# Patient Record
Sex: MALE | Race: BLACK OR AFRICAN AMERICAN | NOT HISPANIC OR LATINO
[De-identification: names, ages, dates, MRNs, and addresses within clinical notes are randomized per-mention and may not be internally consistent; named-entity substitution may affect disease eponyms.]

---

## 2023-10-08 ENCOUNTER — TRANSCRIPTION ENCOUNTER (OUTPATIENT)
Age: 78
End: 2023-10-08

## 2023-10-08 ENCOUNTER — INPATIENT (INPATIENT)
Facility: HOSPITAL | Age: 78
LOS: 3 days | Discharge: NURSING, REHAB OR LONG-TERM CARE | End: 2023-10-12
Attending: INTERNAL MEDICINE | Admitting: INTERNAL MEDICINE
Payer: MEDICARE

## 2023-10-08 VITALS
TEMPERATURE: 98 F | WEIGHT: 175.05 LBS | OXYGEN SATURATION: 98 % | RESPIRATION RATE: 18 BRPM | SYSTOLIC BLOOD PRESSURE: 149 MMHG | HEIGHT: 71 IN | HEART RATE: 66 BPM | DIASTOLIC BLOOD PRESSURE: 95 MMHG

## 2023-10-08 LAB
ALBUMIN SERPL ELPH-MCNC: 3.2 G/DL — LOW (ref 3.3–5)
ALBUMIN SERPL ELPH-MCNC: 3.3 G/DL — SIGNIFICANT CHANGE UP (ref 3.3–5)
ALP SERPL-CCNC: 89 U/L — SIGNIFICANT CHANGE UP (ref 40–120)
ALP SERPL-CCNC: 97 U/L — SIGNIFICANT CHANGE UP (ref 40–120)
ALT FLD-CCNC: 29 U/L — SIGNIFICANT CHANGE UP (ref 12–78)
ALT FLD-CCNC: 29 U/L — SIGNIFICANT CHANGE UP (ref 12–78)
ANION GAP SERPL CALC-SCNC: 10 MMOL/L — SIGNIFICANT CHANGE UP (ref 5–17)
ANION GAP SERPL CALC-SCNC: 8 MMOL/L — SIGNIFICANT CHANGE UP (ref 5–17)
ANION GAP SERPL CALC-SCNC: 9 MMOL/L — SIGNIFICANT CHANGE UP (ref 5–17)
APTT BLD: 29 SEC — SIGNIFICANT CHANGE UP (ref 24.5–35.6)
AST SERPL-CCNC: 29 U/L — SIGNIFICANT CHANGE UP (ref 15–37)
AST SERPL-CCNC: 35 U/L — SIGNIFICANT CHANGE UP (ref 15–37)
BASOPHILS # BLD AUTO: 0.03 K/UL — SIGNIFICANT CHANGE UP (ref 0–0.2)
BASOPHILS NFR BLD AUTO: 0.3 % — SIGNIFICANT CHANGE UP (ref 0–2)
BILIRUB DIRECT SERPL-MCNC: 0.3 MG/DL — SIGNIFICANT CHANGE UP (ref 0–0.3)
BILIRUB DIRECT SERPL-MCNC: 0.6 MG/DL — HIGH (ref 0–0.3)
BILIRUB INDIRECT FLD-MCNC: 1 MG/DL — SIGNIFICANT CHANGE UP (ref 0.2–1)
BILIRUB INDIRECT FLD-MCNC: 1.9 MG/DL — HIGH (ref 0.2–1)
BILIRUB SERPL-MCNC: 1.3 MG/DL — HIGH (ref 0.2–1.2)
BILIRUB SERPL-MCNC: 2.5 MG/DL — HIGH (ref 0.2–1.2)
BLD GP AB SCN SERPL QL: SIGNIFICANT CHANGE UP
BUN SERPL-MCNC: 10 MG/DL — SIGNIFICANT CHANGE UP (ref 7–23)
BUN SERPL-MCNC: 12 MG/DL — SIGNIFICANT CHANGE UP (ref 7–23)
BUN SERPL-MCNC: 12 MG/DL — SIGNIFICANT CHANGE UP (ref 7–23)
CALCIUM SERPL-MCNC: 9.1 MG/DL — SIGNIFICANT CHANGE UP (ref 8.5–10.1)
CALCIUM SERPL-MCNC: 9.3 MG/DL — SIGNIFICANT CHANGE UP (ref 8.5–10.1)
CALCIUM SERPL-MCNC: 9.3 MG/DL — SIGNIFICANT CHANGE UP (ref 8.5–10.1)
CHLORIDE SERPL-SCNC: 106 MMOL/L — SIGNIFICANT CHANGE UP (ref 96–108)
CHLORIDE SERPL-SCNC: 106 MMOL/L — SIGNIFICANT CHANGE UP (ref 96–108)
CHLORIDE SERPL-SCNC: 109 MMOL/L — HIGH (ref 96–108)
CO2 SERPL-SCNC: 24 MMOL/L — SIGNIFICANT CHANGE UP (ref 22–31)
CO2 SERPL-SCNC: 25 MMOL/L — SIGNIFICANT CHANGE UP (ref 22–31)
CO2 SERPL-SCNC: 25 MMOL/L — SIGNIFICANT CHANGE UP (ref 22–31)
CREAT SERPL-MCNC: 0.89 MG/DL — SIGNIFICANT CHANGE UP (ref 0.5–1.3)
CREAT SERPL-MCNC: 0.95 MG/DL — SIGNIFICANT CHANGE UP (ref 0.5–1.3)
CREAT SERPL-MCNC: 0.96 MG/DL — SIGNIFICANT CHANGE UP (ref 0.5–1.3)
EGFR: 81 ML/MIN/1.73M2 — SIGNIFICANT CHANGE UP
EGFR: 82 ML/MIN/1.73M2 — SIGNIFICANT CHANGE UP
EGFR: 88 ML/MIN/1.73M2 — SIGNIFICANT CHANGE UP
EOSINOPHIL # BLD AUTO: 0.02 K/UL — SIGNIFICANT CHANGE UP (ref 0–0.5)
EOSINOPHIL NFR BLD AUTO: 0.2 % — SIGNIFICANT CHANGE UP (ref 0–6)
ETHANOL SERPL-MCNC: 77 MG/DL — HIGH (ref 0–10)
GLUCOSE SERPL-MCNC: 102 MG/DL — HIGH (ref 70–99)
GLUCOSE SERPL-MCNC: 87 MG/DL — SIGNIFICANT CHANGE UP (ref 70–99)
GLUCOSE SERPL-MCNC: 98 MG/DL — SIGNIFICANT CHANGE UP (ref 70–99)
HCT VFR BLD CALC: 50.2 % — HIGH (ref 39–50)
HGB BLD-MCNC: 16.6 G/DL — SIGNIFICANT CHANGE UP (ref 13–17)
IMM GRANULOCYTES NFR BLD AUTO: 0.3 % — SIGNIFICANT CHANGE UP (ref 0–0.9)
INR BLD: 0.9 RATIO — SIGNIFICANT CHANGE UP (ref 0.85–1.18)
INR BLD: 0.92 RATIO — SIGNIFICANT CHANGE UP (ref 0.85–1.18)
LYMPHOCYTES # BLD AUTO: 0.89 K/UL — LOW (ref 1–3.3)
LYMPHOCYTES # BLD AUTO: 7.7 % — LOW (ref 13–44)
MAGNESIUM SERPL-MCNC: 1.9 MG/DL — SIGNIFICANT CHANGE UP (ref 1.6–2.6)
MAGNESIUM SERPL-MCNC: 2 MG/DL — SIGNIFICANT CHANGE UP (ref 1.6–2.6)
MCHC RBC-ENTMCNC: 33.1 G/DL — SIGNIFICANT CHANGE UP (ref 32–36)
MCHC RBC-ENTMCNC: 33.1 PG — SIGNIFICANT CHANGE UP (ref 27–34)
MCV RBC AUTO: 100 FL — SIGNIFICANT CHANGE UP (ref 80–100)
MONOCYTES # BLD AUTO: 0.59 K/UL — SIGNIFICANT CHANGE UP (ref 0–0.9)
MONOCYTES NFR BLD AUTO: 5.1 % — SIGNIFICANT CHANGE UP (ref 2–14)
NEUTROPHILS # BLD AUTO: 10 K/UL — HIGH (ref 1.8–7.4)
NEUTROPHILS NFR BLD AUTO: 86.4 % — HIGH (ref 43–77)
NRBC # BLD: 0 /100 WBCS — SIGNIFICANT CHANGE UP (ref 0–0)
PHOSPHATE SERPL-MCNC: 3 MG/DL — SIGNIFICANT CHANGE UP (ref 2.5–4.5)
PHOSPHATE SERPL-MCNC: 3.1 MG/DL — SIGNIFICANT CHANGE UP (ref 2.5–4.5)
PLATELET # BLD AUTO: 198 K/UL — SIGNIFICANT CHANGE UP (ref 150–400)
POTASSIUM SERPL-MCNC: 4 MMOL/L — SIGNIFICANT CHANGE UP (ref 3.5–5.3)
POTASSIUM SERPL-MCNC: 4.4 MMOL/L — SIGNIFICANT CHANGE UP (ref 3.5–5.3)
POTASSIUM SERPL-MCNC: 4.8 MMOL/L — SIGNIFICANT CHANGE UP (ref 3.5–5.3)
POTASSIUM SERPL-SCNC: 4 MMOL/L — SIGNIFICANT CHANGE UP (ref 3.5–5.3)
POTASSIUM SERPL-SCNC: 4.4 MMOL/L — SIGNIFICANT CHANGE UP (ref 3.5–5.3)
POTASSIUM SERPL-SCNC: 4.8 MMOL/L — SIGNIFICANT CHANGE UP (ref 3.5–5.3)
PROT SERPL-MCNC: 7.3 GM/DL — SIGNIFICANT CHANGE UP (ref 6–8.3)
PROT SERPL-MCNC: 7.4 GM/DL — SIGNIFICANT CHANGE UP (ref 6–8.3)
PROTHROM AB SERPL-ACNC: 10.7 SEC — SIGNIFICANT CHANGE UP (ref 9.5–13)
PROTHROM AB SERPL-ACNC: 11 SEC — SIGNIFICANT CHANGE UP (ref 9.5–13)
RBC # BLD: 5.02 M/UL — SIGNIFICANT CHANGE UP (ref 4.2–5.8)
RBC # FLD: 13.4 % — SIGNIFICANT CHANGE UP (ref 10.3–14.5)
SODIUM SERPL-SCNC: 140 MMOL/L — SIGNIFICANT CHANGE UP (ref 135–145)
SODIUM SERPL-SCNC: 141 MMOL/L — SIGNIFICANT CHANGE UP (ref 135–145)
SODIUM SERPL-SCNC: 141 MMOL/L — SIGNIFICANT CHANGE UP (ref 135–145)
T3 SERPL-MCNC: 102 NG/DL — SIGNIFICANT CHANGE UP (ref 80–200)
T4 AB SER-ACNC: 5.5 UG/DL — SIGNIFICANT CHANGE UP (ref 4.6–12)
WBC # BLD: 11.57 K/UL — HIGH (ref 3.8–10.5)
WBC # FLD AUTO: 11.57 K/UL — HIGH (ref 3.8–10.5)

## 2023-10-08 PROCEDURE — 73700 CT LOWER EXTREMITY W/O DYE: CPT | Mod: 26,RT,MB

## 2023-10-08 PROCEDURE — 99222 1ST HOSP IP/OBS MODERATE 55: CPT | Mod: 25,57

## 2023-10-08 PROCEDURE — 73590 X-RAY EXAM OF LOWER LEG: CPT | Mod: 26,RT

## 2023-10-08 PROCEDURE — 73610 X-RAY EXAM OF ANKLE: CPT | Mod: 26,RT

## 2023-10-08 PROCEDURE — 70450 CT HEAD/BRAIN W/O DYE: CPT | Mod: 26,MG

## 2023-10-08 PROCEDURE — 70486 CT MAXILLOFACIAL W/O DYE: CPT | Mod: 26,MG

## 2023-10-08 PROCEDURE — 99285 EMERGENCY DEPT VISIT HI MDM: CPT

## 2023-10-08 PROCEDURE — 93010 ELECTROCARDIOGRAM REPORT: CPT | Mod: 76

## 2023-10-08 PROCEDURE — 72125 CT NECK SPINE W/O DYE: CPT | Mod: 26,MG

## 2023-10-08 PROCEDURE — 71045 X-RAY EXAM CHEST 1 VIEW: CPT | Mod: 26

## 2023-10-08 PROCEDURE — G1004: CPT

## 2023-10-08 RX ORDER — THIAMINE MONONITRATE (VIT B1) 100 MG
100 TABLET ORAL DAILY
Refills: 0 | Status: DISCONTINUED | OUTPATIENT
Start: 2023-10-09 | End: 2023-10-09

## 2023-10-08 RX ORDER — SODIUM CHLORIDE 9 MG/ML
1000 INJECTION, SOLUTION INTRAVENOUS
Refills: 0 | Status: DISCONTINUED | OUTPATIENT
Start: 2023-10-08 | End: 2023-10-09

## 2023-10-08 RX ORDER — ENOXAPARIN SODIUM 100 MG/ML
40 INJECTION SUBCUTANEOUS ONCE
Refills: 0 | Status: COMPLETED | OUTPATIENT
Start: 2023-10-08 | End: 2023-10-08

## 2023-10-08 RX ORDER — PANTOPRAZOLE SODIUM 20 MG/1
40 TABLET, DELAYED RELEASE ORAL DAILY
Refills: 0 | Status: DISCONTINUED | OUTPATIENT
Start: 2023-10-08 | End: 2023-10-09

## 2023-10-08 RX ORDER — THIAMINE MONONITRATE (VIT B1) 100 MG
100 TABLET ORAL ONCE
Refills: 0 | Status: COMPLETED | OUTPATIENT
Start: 2023-10-08 | End: 2023-10-08

## 2023-10-08 RX ORDER — THIAMINE MONONITRATE (VIT B1) 100 MG
100 TABLET ORAL ONCE
Refills: 0 | Status: DISCONTINUED | OUTPATIENT
Start: 2023-10-08 | End: 2023-10-09

## 2023-10-08 RX ORDER — HYDRALAZINE HCL 50 MG
10 TABLET ORAL EVERY 8 HOURS
Refills: 0 | Status: DISCONTINUED | OUTPATIENT
Start: 2023-10-08 | End: 2023-10-09

## 2023-10-08 RX ORDER — ENOXAPARIN SODIUM 100 MG/ML
40 INJECTION SUBCUTANEOUS EVERY 24 HOURS
Refills: 0 | Status: DISCONTINUED | OUTPATIENT
Start: 2023-10-09 | End: 2023-10-09

## 2023-10-08 RX ORDER — CEFAZOLIN SODIUM 1 G
2000 VIAL (EA) INJECTION ONCE
Refills: 0 | Status: COMPLETED | OUTPATIENT
Start: 2023-10-08 | End: 2023-10-08

## 2023-10-08 RX ORDER — KETOROLAC TROMETHAMINE 30 MG/ML
15 SYRINGE (ML) INJECTION ONCE
Refills: 0 | Status: DISCONTINUED | OUTPATIENT
Start: 2023-10-08 | End: 2023-10-08

## 2023-10-08 RX ORDER — CHLORHEXIDINE GLUCONATE 213 G/1000ML
1 SOLUTION TOPICAL ONCE
Refills: 0 | Status: COMPLETED | OUTPATIENT
Start: 2023-10-08 | End: 2023-10-08

## 2023-10-08 RX ORDER — KETOROLAC TROMETHAMINE 30 MG/ML
15 SYRINGE (ML) INJECTION EVERY 8 HOURS
Refills: 0 | Status: DISCONTINUED | OUTPATIENT
Start: 2023-10-08 | End: 2023-10-09

## 2023-10-08 RX ADMIN — Medication 100 MILLIGRAM(S): at 09:05

## 2023-10-08 RX ADMIN — Medication 1 PATCH: at 11:55

## 2023-10-08 RX ADMIN — Medication 15 MILLIGRAM(S): at 16:50

## 2023-10-08 RX ADMIN — Medication 1 PATCH: at 18:43

## 2023-10-08 RX ADMIN — Medication 2 MILLIGRAM(S): at 07:32

## 2023-10-08 RX ADMIN — Medication 2000 MILLIGRAM(S): at 09:30

## 2023-10-08 RX ADMIN — Medication 15 MILLIGRAM(S): at 17:15

## 2023-10-08 RX ADMIN — ENOXAPARIN SODIUM 40 MILLIGRAM(S): 100 INJECTION SUBCUTANEOUS at 10:09

## 2023-10-08 RX ADMIN — Medication 25 MILLIGRAM(S): at 21:41

## 2023-10-08 RX ADMIN — Medication 100 MILLIGRAM(S): at 11:58

## 2023-10-08 RX ADMIN — CHLORHEXIDINE GLUCONATE 1 APPLICATION(S): 213 SOLUTION TOPICAL at 17:56

## 2023-10-08 NOTE — H&P ADULT - ASSESSMENT
IMPROVE VTE Individual Risk Assessment    RISK                                                                Points    [  ] Previous VTE                                                  3    [  ] Thrombophilia                                               2    [  ] Lower limb paralysis                                      2        (unable to hold up >15 seconds)      [  ] Current Cancer                                              2         (within 6 months)    [x  ] Immobilization > 24 hrs                                1    [  ] ICU/CCU stay > 24 hours                              1    [ x ] Age > 60                                                      1    IMPROVE VTE Score ______2___    IMPROVE Score 0-1: Low Risk, No VTE prophylaxis required for most patients, encourage ambulation.   IMPROVE Score 2-3: At risk, pharmacologic VTE prophylaxis is indicated for most patients (in the absence of a contraindication)  IMPROVE Score > or = 4: High Risk, pharmacologic VTE prophylaxis is indicated for most patients (in the absence of a contraindication)    A/P  79 yo  w  male  with  hx  htn unsure  of  medications  daily  etoh consumtion of 2/3 hard  drinks  /day  admitted  with rt  trimalleolar fx  unclear  if  syncopal  episode or  etoh  intoxication    # trimalleolar  fx    dvt prophylaxis  pain management  further Rx  as per ortho    # HTN  will  treat  with  clonidine  patch  hydralazine prn  for  now    will restart  home  meds  post  surgery  and  once   clarified    # etoh  use /abuse  will monitor with  symptom driven CIWA protocol  #possible  syncope  check EKG   monitor  on  telemetry further w/u  and  Rx  as per  clinical course

## 2023-10-08 NOTE — ED ADULT NURSE NOTE - SIGNIFICANT NEGATIVE FINDINGS
Denies fall, trauma, blood thinner use, LOC, head injury, vision changes, n/v, numbness, tingling, or other pain

## 2023-10-08 NOTE — H&P ADULT - NSHPPHYSICALEXAM_GEN_ALL_CORE
PHYSICAL EXAM:    GENERAL: NAD, well-groomed, well-developed  HEAD:    dry  excoriations  rt  face  Normocephalic  EYES: EOMI, PERRLA, conjunctiva and sclera clear  ENMT: No tonsillar erythema, exudates, or enlargement; Moist mucous membranes, , No lesions  NECK: Supple, No JVD, Normal thyroid  NERVOUS SYSTEM:  Alert & Oriented X4, ; Motor Strength 5/5 B/L upper and lower extremities; DTRs 2+ intact and symmetric  CHEST/LUNG: Clear  bilaterally; No rales, rhonchi, wheezing, or rubs  HEART: Regular rate and rhythm; No murmurs, rubs, or gallops  ABDOMEN: Soft, Nontender, Nondistended; no  masses Bowel sounds present  EXTREMITIES:  + Peripheral Pulses, No clubbing, cyanosis, or edema  LYMPH: No lymphadenopathy noted   RECTAL: deferred    SKIN: No rashes or lesions PHYSICAL EXAM:    GENERAL: NAD, well-groomed, well-developed  HEAD:    dry  excoriations  rt  face  Normocephalic  EYES: L eye  blindness, conjunctiva and sclera clear  ENMT: No tonsillar erythema, exudates, or enlargement; Moist mucous membranes, , No lesions  NECK: Supple, No JVD, Normal thyroid  NERVOUS SYSTEM:  Alert & Oriented X4, ; Motor Strength 5/5 B/L upper and lower extremities; DTRs 2+ intact and symmetric  CHEST/LUNG: Clear  bilaterally; No rales, rhonchi, wheezing, or rubs  HEART: Regular rate and rhythm; No murmurs, rubs, or gallops  ABDOMEN: Soft, Nontender, Nondistended; no  masses Bowel sounds present  EXTREMITIES:  + Peripheral Pulses, No clubbing, cyanosis, or edema  LYMPH: No lymphadenopathy noted   RECTAL: deferred    SKIN: No rashes or lesions

## 2023-10-08 NOTE — ED ADULT NURSE NOTE - OBJECTIVE STATEMENT
Pt A&Ox4 presents to ED c/o R ankle pain. As per pt he went to get out of bed tonight and noticed that he could not bear weight on the R ankle. Denies fall, trauma, blood thinner use, LOC, head injury, vision changes, n/v, numbness, tingling, or other pain. Pt states he does not know how he injured his ankle. Of note, pt has 1 inch lac to the bridge of the nose. NKA. Of note, pt is a daily drinker and admits to having 2 drinks last night. Pt A&Ox4 presents to ED c/o R ankle pain. As per pt he went to get out of bed tonight and noticed that he could not bear weight on the R ankle. Denies fall, trauma, blood thinner use, LOC, head injury, vision changes, n/v, numbness, tingling, or other pain. Pt states he does not know how he injured his ankle. Of note, pt has abrasion to the bridge of the nose. NKA. Of note, pt is a daily drinker and admits to having 2 drinks last night. ROM in R ankle limited. Xenograft Text: The defect edges were debeveled with a #15 scalpel blade.  Given the location of the defect, shape of the defect and the proximity to free margins a xenograft was deemed most appropriate.  The graft was then trimmed to fit the size of the defect.  The graft was then placed in the primary defect and oriented appropriately.

## 2023-10-08 NOTE — H&P ADULT - NSHPLABSRESULTS_GEN_ALL_CORE
LABS:                        16.6   11.57 )-----------( 198      ( 08 Oct 2023 06:40 )             50.2     10-08    140  |  106  |  12  ----------------------------<  102<H>  4.4   |  25  |  0.95    Ca    9.1      08 Oct 2023 06:40      PT/INR - ( 08 Oct 2023 06:40 )   PT: 10.7 sec;   INR: 0.90 ratio         PTT - ( 08 Oct 2023 06:40 )  PTT:29.0 sec  Urinalysis Basic - ( 08 Oct 2023 06:40 )    Color: x / Appearance: x / SG: x / pH: x  Gluc: 102 mg/dL / Ketone: x  / Bili: x / Urobili: x   Blood: x / Protein: x / Nitrite: x   Leuk Esterase: x / RBC: x / WBC x   Sq Epi: x / Non Sq Epi: x / Bacteria: x

## 2023-10-08 NOTE — ED ADULT NURSE NOTE - CHIEF COMPLAINT QUOTE
bibems from home for right ankle pain.  Pt arrived with splint in place by EMS, per EMS, pt woke up and was unable to bear weight on right ankle.  EMS did notice bruising on the anterior aspect of ankle.   pt denies any fall or trauma, blood thinner use, LOC, head injury, other pain.  Pt able to move toes, cap refills <3, pulses present +2, no loss of sensation.    hx of HTN, nkda.

## 2023-10-08 NOTE — CONSULT NOTE ADULT - SUBJECTIVE AND OBJECTIVE BOX
78y Male community ambulator without assistive device presents to ED after mechanical fall with severe right ankle pain. Has multiple drinks of scotch and vodka every evening for the last 30 years. Patient does not recall any details about the incident; just that he woke up and felt immediate pain and inability to ambulate on the affected leg. Subsequently came to the ED for further evaluation and management. In the ED, the patient notes pain over the affected ankle and pain with range of motion. Patient denies any numbness, tingling, weakness, or any other orthopaedic complaint.     Physical Exam  Vital Signs Last 24 Hrs  T(C): 36.4 (10-08-23 @ 04:54), Max: 36.4 (10-08-23 @ 04:54)  T(F): 97.6 (10-08-23 @ 04:54), Max: 97.6 (10-08-23 @ 04:54)  HR: 66 (10-08-23 @ 04:54) (66 - 66)  BP: 149/95 (10-08-23 @ 04:54) (149/95 - 149/95)  BP(mean): --  RR: 18 (10-08-23 @ 04:54) (18 - 18)  SpO2: 98% (10-08-23 @ 04:54) (98% - 98%)    Gen: Resting in bed, NAD  R LE:   Skin intact. Notable edema and deformity over the ankle. Healing eschar over the medial aspect of the distal leg   TTP over deformity, decreased and painful ROM of the ankle; otherwise, NTTP throughout the rest of the extremity.  SILT L2-S1.    Q/H/EHL/FHL intact. TA/GSC unable to assess 2/2 pain.    DP pulse dopplerable.   No calf tenderness bilaterally.   Compartments soft and compressible.     Secondary Assessment:  NC/AT, NTTP of clavicles, NTTP of C-spine,T-spine, or L-spine in the midline and paraspinal areas; NTTP of pelvis  LUE: NTTP of Shoulder, Elbow, Wrist, Hand; NT with AROM/PROM of Shoulder, Elbow, Wrist, Hand; AIN/PIN/Med/Uln/Msc/Rad/Ax intact  RUE: NTTP of Shoulder, Elbow, Wrist, Hand; NT with AROM/PROM of Shoulder, Elbow, Wrist, Hand; AIN/PIN/Med/Uln/Msc/Rad/Ax intact   LLE: Able to SLR, NT with Log Roll, NT with Heel Strike, NTTP of Hip, Knee, Ankle, Foot; NT with AROM/PROM of Hip, Knee, Ankle, Foot; Q/H/GSC/TA/EHL/FHL intact      Imaging: XR imaging of the right ankle reviewed demonstrating trimalleolar ankle fracture dislocation.    Procedure Note  Risks and benefits for the procedure were explained to the patient. An injection was offered to the patient for analgesia prior to procedure. The patient expressed understanding and agreed with the plan. The patient gave verbal consent for the injection and procedure. The skin was prepped in sterile fashion with alcohol scrub. The fracture site was then injected with 10mL 1% lidocaine without epinephrine. Time was allowed for anesthetic effect to occur. Once the patient was comfortable, the extremity was generally cleaned. The fracture was then manipulated/closed reduced. The extremity was wrapped with Webril, with care to pad the bony prominences over the heel and malleoli. A plaster splint was applied; wrapped with Webril and an ace wrap; and molded until hardened. Care was taken to avoid sharp edges and to protect the skin. The extremity was elevated on pillows and ice was applied. Neurovascular exam was unchanged after the procedure - SILT digits 1-5 and able to wiggle toes. Intact EHL/FHL. Post reduction films were ordered and demonstrated adequate reduction of the fracture.    Assessment and Plan  78y Male with right ankle fracture    Imaging findings reviewed and discussed with the patient. Need for operative intervention discussed.   Fracture reduced and splinted per above procedure note.   NWB R LE in trilam splint, crutches/walker for ambulation as needed  Ice/elevation  Pain control PRN  Prophylactic abx with ancef 2g q8h  Dispo: pending CT evaluation of the right ankle, possible OR. NPO at this time.   Will discuss with  *** and advise of any changes to the plan.     78y Male community ambulator without assistive device presents to ED after mechanical fall with severe right ankle pain. Has multiple drinks of scotch and vodka every evening for the last 30 years. Patient does not recall any details about the incident; just that he woke up and felt immediate pain and inability to ambulate on the affected leg. Subsequently came to the ED for further evaluation and management. In the ED, the patient notes pain over the affected ankle and pain with range of motion. Patient denies any numbness, tingling, weakness, or any other orthopaedic complaint.     Physical Exam  Vital Signs Last 24 Hrs  T(C): 36.4 (10-08-23 @ 04:54), Max: 36.4 (10-08-23 @ 04:54)  T(F): 97.6 (10-08-23 @ 04:54), Max: 97.6 (10-08-23 @ 04:54)  HR: 66 (10-08-23 @ 04:54) (66 - 66)  BP: 149/95 (10-08-23 @ 04:54) (149/95 - 149/95)  BP(mean): --  RR: 18 (10-08-23 @ 04:54) (18 - 18)  SpO2: 98% (10-08-23 @ 04:54) (98% - 98%)    Gen: Resting in bed, NAD  R LE:   Skin intact. Notable edema and deformity over the ankle. Healing eschar over the medial aspect of the distal leg   TTP over deformity, decreased and painful ROM of the ankle; otherwise, NTTP throughout the rest of the extremity.  SILT L2-S1.    Q/H/EHL/FHL intact. TA/GSC unable to assess 2/2 pain.    DP pulse dopplerable.   No calf tenderness bilaterally.   Compartments soft and compressible.     Secondary Assessment:  NC/AT, NTTP of clavicles, NTTP of C-spine,T-spine, or L-spine in the midline and paraspinal areas; NTTP of pelvis  LUE: NTTP of Shoulder, Elbow, Wrist, Hand; NT with AROM/PROM of Shoulder, Elbow, Wrist, Hand; AIN/PIN/Med/Uln/Msc/Rad/Ax intact  RUE: NTTP of Shoulder, Elbow, Wrist, Hand; NT with AROM/PROM of Shoulder, Elbow, Wrist, Hand; AIN/PIN/Med/Uln/Msc/Rad/Ax intact   LLE: Able to SLR, NT with Log Roll, NT with Heel Strike, NTTP of Hip, Knee, Ankle, Foot; NT with AROM/PROM of Hip, Knee, Ankle, Foot; Q/H/GSC/TA/EHL/FHL intact      Imaging: XR imaging of the right ankle reviewed demonstrating trimalleolar ankle fracture dislocation.    Procedure Note  Risks and benefits for the procedure were explained to the patient. An injection was offered to the patient for analgesia prior to procedure. The patient expressed understanding and agreed with the plan. The patient gave verbal consent for the injection and procedure. The skin was prepped in sterile fashion with alcohol scrub. The fracture site was then injected with 10mL 1% lidocaine without epinephrine. Time was allowed for anesthetic effect to occur. Once the patient was comfortable, the extremity was generally cleaned. The fracture was then manipulated/closed reduced. The extremity was wrapped with Webril, with care to pad the bony prominences over the heel and malleoli. A plaster splint was applied; wrapped with Webril and an ace wrap; and molded until hardened. Care was taken to avoid sharp edges and to protect the skin. The extremity was elevated on pillows and ice was applied. Neurovascular exam was unchanged after the procedure - SILT digits 1-5 and able to wiggle toes. Intact EHL/FHL. Post reduction films were ordered and demonstrated adequate reduction of the fracture.    Assessment and Plan  78y Male with right ankle fracture    Imaging findings reviewed and discussed with the patient. Need for operative intervention discussed.   Fracture reduced and splinted per above procedure note.   NWB R SELENA in trilam splint, crutches/walker for ambulation as needed  Ice/elevation  Pain control PRN  Dispo: recommend medicine admission given significant drinking history and need for CIWA, can plan for surgery ideally 10/9   Will discuss with Dr. Puri and advise of any changes to the plan.     78y Male community ambulator without assistive device presents to ED after mechanical fall with severe right ankle pain. Has multiple drinks of scotch and vodka every evening for the last 30 years. Patient does not recall any details about the incident; just that he woke up and felt immediate pain and inability to ambulate on the affected leg. Subsequently came to the ED for further evaluation and management. In the ED, the patient notes pain over the affected ankle and pain with range of motion. Patient denies any numbness, tingling, weakness, or any other orthopaedic complaint.     Physical Exam  Vital Signs Last 24 Hrs  T(C): 36.4 (10-08-23 @ 04:54), Max: 36.4 (10-08-23 @ 04:54)  T(F): 97.6 (10-08-23 @ 04:54), Max: 97.6 (10-08-23 @ 04:54)  HR: 66 (10-08-23 @ 04:54) (66 - 66)  BP: 149/95 (10-08-23 @ 04:54) (149/95 - 149/95)  BP(mean): --  RR: 18 (10-08-23 @ 04:54) (18 - 18)  SpO2: 98% (10-08-23 @ 04:54) (98% - 98%)    Gen: Resting in bed, NAD  Pt is AAOx3    R LE:   Skin intact. Notable edema and deformity over the ankle. Healing eschar over the medial aspect of the distal leg   TTP over deformity, decreased and painful ROM of the ankle; otherwise, NTTP throughout the rest of the extremity.  SILT L2-S1.    Q/H/EHL/FHL intact. TA/GSC unable to assess 2/2 pain.    DP pulse dopplerable.   No calf tenderness bilaterally.   Compartments soft and compressible.     Secondary Assessment:  NC/AT, NTTP of clavicles, NTTP of C-spine,T-spine, or L-spine in the midline and paraspinal areas; NTTP of pelvis  LUE: NTTP of Shoulder, Elbow, Wrist, Hand; NT with AROM/PROM of Shoulder, Elbow, Wrist, Hand; AIN/PIN/Med/Uln/Msc/Rad/Ax intact  RUE: NTTP of Shoulder, Elbow, Wrist, Hand; NT with AROM/PROM of Shoulder, Elbow, Wrist, Hand; AIN/PIN/Med/Uln/Msc/Rad/Ax intact   LLE: Able to SLR, NT with Log Roll, NT with Heel Strike, NTTP of Hip, Knee, Ankle, Foot; NT with AROM/PROM of Hip, Knee, Ankle, Foot; Q/H/GSC/TA/EHL/FHL intact      Imaging: XR imaging of the right ankle reviewed demonstrating trimalleolar ankle fracture dislocation.    Procedure Note  Risks and benefits for the procedure were explained to the patient. An injection was offered to the patient for analgesia prior to procedure. The patient expressed understanding and agreed with the plan. The patient gave verbal consent for the injection and procedure. The skin was prepped in sterile fashion with alcohol scrub. The fracture site was then injected with 10mL 1% lidocaine without epinephrine. Time was allowed for anesthetic effect to occur. Once the patient was comfortable, the extremity was generally cleaned. The fracture was then manipulated/closed reduced. The extremity was wrapped with Webril, with care to pad the bony prominences over the heel and malleoli. A plaster splint was applied; wrapped with Webril and an ace wrap; and molded until hardened. Care was taken to avoid sharp edges and to protect the skin. The extremity was elevated on pillows and ice was applied. Neurovascular exam was unchanged after the procedure - SILT digits 1-5 and able to wiggle toes. Intact EHL/FHL. Post reduction films were ordered and demonstrated adequate reduction of the fracture.    Assessment and Plan  78y Male with right ankle fracture    Imaging findings reviewed and discussed with the patient. Need for operative intervention discussed.   Fracture reduced and splinted per above procedure note.   NWB R LE in trilam splint, crutches/walker for ambulation as needed  Ice/elevation  Pain control PRN  Dispo: recommend medicine admission given significant drinking history and need for CIWA, can plan for surgery ideally 10/9   Will discuss with Dr. Puri and advise of any changes to the plan.

## 2023-10-08 NOTE — ED PROVIDER NOTE - PROGRESS NOTE DETAILS
ortho consulted , eval pending. MD Marshal: Pt received on sign-out from Dr. Bowling. 78 M w/ R trimal/ ankle reduction & splint placed by orthopedics. orhopedics planning for OR tomorrow. will admit to medicine for monitoring for possible EtOH withdrawal

## 2023-10-08 NOTE — ED ADULT TRIAGE NOTE - CHIEF COMPLAINT QUOTE
bibems from home for right ankle pain.  per EMS, pt woke up and was unable to bear weight on right ankle, pt denies any fall or trauma, LOC, head injury, other pain.  Pt able to move toes, cap refills <3, pulses present +2, no loss of sensation.    hx of HTN, nkda. bibems from home for right ankle pain.  Pt arrived with splint in place by EMS, per EMS, pt woke up and was unable to bear weight on right ankle.  EMS did notice bruising on the anterior aspect of ankle.   pt denies any fall or trauma, blood thinner use, LOC, head injury, other pain.  Pt able to move toes, cap refills <3, pulses present +2, no loss of sensation.    hx of HTN, nkda.

## 2023-10-08 NOTE — H&P ADULT - NSICDXPASTMEDICALHX_GEN_ALL_CORE_FT
PAST MEDICAL HISTORY:  HTN (hypertension)      PAST MEDICAL HISTORY:  Acquired blindness of one eye     HTN (hypertension)

## 2023-10-08 NOTE — ED PROVIDER NOTE - CLINICAL SUMMARY MEDICAL DECISION MAKING FREE TEXT BOX
78-year-old male history of hypertension brought in by EMS for right ankle pain.  States he was drinking alcohol today, unknown quantity.  States he will go home and try to put weight on his ankle was unable to do so.  Patient states he likely fell but does not remember given abrasion over nose and bruising over the medial aspect of the right ankle.  Family member at bedside to provide collateral.  Was brought in from home.  Denies blood thinner use.  Denies head or neck pain currently.  Endorsing pain primarily over the right ankle.  Normally does not use anything to ambulate with.  Patient has DP, posterior tibial pulse as well, checked with Doppler.  Sensation intact.  Tenderness over the medial malleolus.  Likely acute fracture given clinical exam.  Unable to ephraim or invert at the ankle.  Able to flex/extend at the knee with full range of motion.  No pain over the pelvis, pelvis is stable.  Abrasion over nose, no nasal septal hematoma appreciated on my exam.  No other signs of external trauma.  No midline C-spine tenderness.  Stable vitals on arrival.  CT head, CT C-spine, CT maxillofacial.  Rule out fracture, bleed.  X-ray ankle, tib-fib rule out likely fracture, assess for dislocation as well.  Will reassess status post imaging.

## 2023-10-08 NOTE — H&P ADULT - NSHPREVIEWOFSYSTEMS_GEN_ALL_CORE
79 yo  M  with  hx  htn  hx  daily  etoh use  2/3 drink  everyday  for  40  years  evaluated  in  ER  with co rt  ankle  pain inability  to  stand  found  to  have  trimalleolar fx  admitted  for  surgical repair  states   he  must  have  fallen states  is  never  drunk  has  he  takes same  amount of  alcohol  daily for  over 40 years   denies  chest pain  sob  ha  blurry  vision continues  with  some  rt  ankle pain  states  take 2 medications  for HTN Losartan 100 mg/day  and  another  medication  which  cannot  remember  name  10 mg/day  have called  pt's  significant  other  Dahlia  [phone] she does  not  know  medication and  is  not  home to look

## 2023-10-08 NOTE — ED PROVIDER NOTE - OBJECTIVE STATEMENT
78-year-old male history of hypertension brought in by EMS for right ankle pain.  States he was drinking alcohol today, unknown quantity.  States he will go home and try to put weight on his ankle was unable to do so.  Patient states he likely fell but does not remember given abrasion over nose and bruising over the medial aspect of the right ankle.  Family member at bedside to provide collateral.  Was brought in from home.  Denies blood thinner use.  Denies head or neck pain currently.  Endorsing pain primarily over the right ankle.  Normally does not use anything to ambulate with.

## 2023-10-08 NOTE — ED ADULT NURSE REASSESSMENT NOTE - NS ED NURSE REASSESS COMMENT FT1
Assumed care at 0700hrs. Patient Alert and oriented x 4. Orthopedic doctors at bedside, attempting to reduce ankle fracture dislocation. Patient given and tolerated 2 mg Ativan For reduction after ankle block given. Patient admits to drinking "a lot of vodka and scotch every day." Dr. Araya made aware.

## 2023-10-08 NOTE — ED ADULT NURSE NOTE - NSFALLUNIVINTERV_ED_ALL_ED
Bed/Stretcher in lowest position, wheels locked, appropriate side rails in place/Call bell, personal items and telephone in reach/Instruct patient to call for assistance before getting out of bed/chair/stretcher/Non-slip footwear applied when patient is off stretcher/[place] to call system/Physically safe environment - no spills, clutter or unnecessary equipment/Purposeful proactive rounding/Room/bathroom lighting operational, light cord in reach

## 2023-10-08 NOTE — ED ADULT NURSE NOTE - ED STAT RN HANDOFF DETAILS
Report received from RADHA Field. Safety checks completed this shift. Safety rounds completed hourly.  IV sites checked Q2+remains WDL. Medications administered as ordered with no signs/symptoms of adverse reactions. Fall & skin precautions in place. pt pending bed assignment. no acute distress noted. respirations even and unlabored.

## 2023-10-08 NOTE — ED PROVIDER NOTE - PHYSICAL EXAMINATION
General: Well appearing male in no acute distress  HEENT: Normocephalic, atraumatic. Moist mucous membranes. Oropharynx clear. No lymphadenopathy. no nasal septal hematoma.   Eyes: No scleral icterus. EOMI. LINDSEY.  Neck:. Soft and supple. Full ROM without pain. No midline tenderness  Cardiac: Regular rate and regular rhythm. No murmurs, rubs, gallops. Peripheral pulses 2+ and symmetric. No LE edema.  Resp: Lungs CTAB. Speaking in full sentences. No wheezes, rales or rhonchi.  Abd: Soft, non-tender, non-distended. No guarding or rebound. No scars, masses, or lesions.  Back: Spine midline and non-tender. No CVA tenderness.    Skin: +abrasion over nose, ecchymosis over medial aspect of ankle, edema.   Neuro: AO x 3. Moves all extremities symmetrically. Motor strength and sensation grossly intact.  MSK: DP2+, PT 2+, unable to ephraim/invert at the R ankle, full ROM at the knee. sensation intact. no crepitus over ankle, compartment of LLE is soft.

## 2023-10-08 NOTE — ED ADULT NURSE NOTE - ED STAT RN HANDOFF DETAILS 2
Report endorsed to RADHA Nettles. Safety checks completed this shift. Safety rounds completed hourly.  IV sites checked Q2+remains WDL. Medications administered as ordered with no signs/symptoms of adverse reactions. Fall & skin precautions in place. Any issues endorsed to RADHA Nettles for follow up.

## 2023-10-09 ENCOUNTER — TRANSCRIPTION ENCOUNTER (OUTPATIENT)
Age: 78
End: 2023-10-09

## 2023-10-09 LAB
ALBUMIN SERPL ELPH-MCNC: 3.1 G/DL — LOW (ref 3.3–5)
ALP SERPL-CCNC: 96 U/L — SIGNIFICANT CHANGE UP (ref 40–120)
ALT FLD-CCNC: 24 U/L — SIGNIFICANT CHANGE UP (ref 12–78)
ANION GAP SERPL CALC-SCNC: 10 MMOL/L — SIGNIFICANT CHANGE UP (ref 5–17)
ANION GAP SERPL CALC-SCNC: 7 MMOL/L — SIGNIFICANT CHANGE UP (ref 5–17)
APTT BLD: 34.3 SEC — SIGNIFICANT CHANGE UP (ref 24.5–35.6)
AST SERPL-CCNC: 33 U/L — SIGNIFICANT CHANGE UP (ref 15–37)
BILIRUB SERPL-MCNC: 3 MG/DL — HIGH (ref 0.2–1.2)
BUN SERPL-MCNC: 18 MG/DL — SIGNIFICANT CHANGE UP (ref 7–23)
BUN SERPL-MCNC: 21 MG/DL — SIGNIFICANT CHANGE UP (ref 7–23)
CALCIUM SERPL-MCNC: 8.8 MG/DL — SIGNIFICANT CHANGE UP (ref 8.5–10.1)
CALCIUM SERPL-MCNC: 9.4 MG/DL — SIGNIFICANT CHANGE UP (ref 8.5–10.1)
CHLORIDE SERPL-SCNC: 105 MMOL/L — SIGNIFICANT CHANGE UP (ref 96–108)
CHLORIDE SERPL-SCNC: 111 MMOL/L — HIGH (ref 96–108)
CO2 SERPL-SCNC: 22 MMOL/L — SIGNIFICANT CHANGE UP (ref 22–31)
CO2 SERPL-SCNC: 24 MMOL/L — SIGNIFICANT CHANGE UP (ref 22–31)
CREAT SERPL-MCNC: 0.87 MG/DL — SIGNIFICANT CHANGE UP (ref 0.5–1.3)
CREAT SERPL-MCNC: 1.06 MG/DL — SIGNIFICANT CHANGE UP (ref 0.5–1.3)
EGFR: 72 ML/MIN/1.73M2 — SIGNIFICANT CHANGE UP
EGFR: 88 ML/MIN/1.73M2 — SIGNIFICANT CHANGE UP
GLUCOSE SERPL-MCNC: 112 MG/DL — HIGH (ref 70–99)
GLUCOSE SERPL-MCNC: 86 MG/DL — SIGNIFICANT CHANGE UP (ref 70–99)
HCT VFR BLD CALC: 45.1 % — SIGNIFICANT CHANGE UP (ref 39–50)
HCT VFR BLD CALC: 50 % — SIGNIFICANT CHANGE UP (ref 39–50)
HCV AB S/CO SERPL IA: 0.17 S/CO — SIGNIFICANT CHANGE UP (ref 0–0.99)
HCV AB SERPL-IMP: SIGNIFICANT CHANGE UP
HGB BLD-MCNC: 15.5 G/DL — SIGNIFICANT CHANGE UP (ref 13–17)
HGB BLD-MCNC: 17.1 G/DL — HIGH (ref 13–17)
INR BLD: 0.92 RATIO — SIGNIFICANT CHANGE UP (ref 0.85–1.18)
MCHC RBC-ENTMCNC: 33.9 PG — SIGNIFICANT CHANGE UP (ref 27–34)
MCHC RBC-ENTMCNC: 34.2 G/DL — SIGNIFICANT CHANGE UP (ref 32–36)
MCHC RBC-ENTMCNC: 34.4 G/DL — SIGNIFICANT CHANGE UP (ref 32–36)
MCHC RBC-ENTMCNC: 34.4 PG — HIGH (ref 27–34)
MCV RBC AUTO: 100.2 FL — HIGH (ref 80–100)
MCV RBC AUTO: 99 FL — SIGNIFICANT CHANGE UP (ref 80–100)
NRBC # BLD: 0 /100 WBCS — SIGNIFICANT CHANGE UP (ref 0–0)
NRBC # BLD: 0 /100 WBCS — SIGNIFICANT CHANGE UP (ref 0–0)
PLATELET # BLD AUTO: 145 K/UL — LOW (ref 150–400)
PLATELET # BLD AUTO: 180 K/UL — SIGNIFICANT CHANGE UP (ref 150–400)
POTASSIUM SERPL-MCNC: 4.2 MMOL/L — SIGNIFICANT CHANGE UP (ref 3.5–5.3)
POTASSIUM SERPL-MCNC: 4.4 MMOL/L — SIGNIFICANT CHANGE UP (ref 3.5–5.3)
POTASSIUM SERPL-SCNC: 4.2 MMOL/L — SIGNIFICANT CHANGE UP (ref 3.5–5.3)
POTASSIUM SERPL-SCNC: 4.4 MMOL/L — SIGNIFICANT CHANGE UP (ref 3.5–5.3)
PROT SERPL-MCNC: 7.2 GM/DL — SIGNIFICANT CHANGE UP (ref 6–8.3)
PROTHROM AB SERPL-ACNC: 11.1 SEC — SIGNIFICANT CHANGE UP (ref 9.5–13)
RBC # BLD: 4.5 M/UL — SIGNIFICANT CHANGE UP (ref 4.2–5.8)
RBC # BLD: 5.05 M/UL — SIGNIFICANT CHANGE UP (ref 4.2–5.8)
RBC # FLD: 13.3 % — SIGNIFICANT CHANGE UP (ref 10.3–14.5)
RBC # FLD: 13.3 % — SIGNIFICANT CHANGE UP (ref 10.3–14.5)
SODIUM SERPL-SCNC: 139 MMOL/L — SIGNIFICANT CHANGE UP (ref 135–145)
SODIUM SERPL-SCNC: 140 MMOL/L — SIGNIFICANT CHANGE UP (ref 135–145)
WBC # BLD: 11.89 K/UL — HIGH (ref 3.8–10.5)
WBC # BLD: 12.71 K/UL — HIGH (ref 3.8–10.5)
WBC # FLD AUTO: 11.89 K/UL — HIGH (ref 3.8–10.5)
WBC # FLD AUTO: 12.71 K/UL — HIGH (ref 3.8–10.5)

## 2023-10-09 PROCEDURE — 27829 TREAT LOWER LEG JOINT: CPT | Mod: RT

## 2023-10-09 PROCEDURE — 27822 TREATMENT OF ANKLE FRACTURE: CPT | Mod: RT

## 2023-10-09 DEVICE — SCREW LOCKING 3.5X20: Type: IMPLANTABLE DEVICE | Site: RIGHT | Status: FUNCTIONAL

## 2023-10-09 DEVICE — SCREW FT 3.5X12MM: Type: IMPLANTABLE DEVICE | Site: RIGHT | Status: FUNCTIONAL

## 2023-10-09 DEVICE — SCREW 3.5X50MM: Type: IMPLANTABLE DEVICE | Site: RIGHT | Status: FUNCTIONAL

## 2023-10-09 DEVICE — SCREW CANN TI 4 X44MM: Type: IMPLANTABLE DEVICE | Site: RIGHT | Status: FUNCTIONAL

## 2023-10-09 DEVICE — SCREW LOKG 3.5 X16MM: Type: IMPLANTABLE DEVICE | Site: RIGHT | Status: FUNCTIONAL

## 2023-10-09 DEVICE — SCREW LOKG 3.5X14MM: Type: IMPLANTABLE DEVICE | Site: RIGHT | Status: FUNCTIONAL

## 2023-10-09 DEVICE — SCREW 3.5X14MM: Type: IMPLANTABLE DEVICE | Site: RIGHT | Status: FUNCTIONAL

## 2023-10-09 DEVICE — GWIRE 1.4X150MM: Type: IMPLANTABLE DEVICE | Site: RIGHT | Status: FUNCTIONAL

## 2023-10-09 DEVICE — SCREW VARIAX LOCKING 3.5X18MM: Type: IMPLANTABLE DEVICE | Site: RIGHT | Status: FUNCTIONAL

## 2023-10-09 DEVICE — PLATE FIBULA 4 HOLE: Type: IMPLANTABLE DEVICE | Site: RIGHT | Status: FUNCTIONAL

## 2023-10-09 DEVICE — K-WIRE STRYKER WITH 4.5 MM STOP 2MM X 150: Type: IMPLANTABLE DEVICE | Site: RIGHT | Status: FUNCTIONAL

## 2023-10-09 RX ORDER — SODIUM CHLORIDE 9 MG/ML
1000 INJECTION, SOLUTION INTRAVENOUS
Refills: 0 | Status: DISCONTINUED | OUTPATIENT
Start: 2023-10-09 | End: 2023-10-10

## 2023-10-09 RX ORDER — PANTOPRAZOLE SODIUM 20 MG/1
40 TABLET, DELAYED RELEASE ORAL
Refills: 0 | Status: DISCONTINUED | OUTPATIENT
Start: 2023-10-09 | End: 2023-10-12

## 2023-10-09 RX ORDER — THIAMINE MONONITRATE (VIT B1) 100 MG
100 TABLET ORAL DAILY
Refills: 0 | Status: COMPLETED | OUTPATIENT
Start: 2023-10-09 | End: 2023-10-12

## 2023-10-09 RX ORDER — ONDANSETRON 8 MG/1
4 TABLET, FILM COATED ORAL ONCE
Refills: 0 | Status: DISCONTINUED | OUTPATIENT
Start: 2023-10-09 | End: 2023-10-10

## 2023-10-09 RX ORDER — OXYCODONE HYDROCHLORIDE 5 MG/1
2.5 TABLET ORAL EVERY 4 HOURS
Refills: 0 | Status: DISCONTINUED | OUTPATIENT
Start: 2023-10-09 | End: 2023-10-12

## 2023-10-09 RX ORDER — ACETAMINOPHEN 500 MG
650 TABLET ORAL EVERY 6 HOURS
Refills: 0 | Status: DISCONTINUED | OUTPATIENT
Start: 2023-10-09 | End: 2023-10-12

## 2023-10-09 RX ORDER — MAGNESIUM HYDROXIDE 400 MG/1
30 TABLET, CHEWABLE ORAL DAILY
Refills: 0 | Status: DISCONTINUED | OUTPATIENT
Start: 2023-10-09 | End: 2023-10-12

## 2023-10-09 RX ORDER — CEFAZOLIN SODIUM 1 G
2000 VIAL (EA) INJECTION EVERY 8 HOURS
Refills: 0 | Status: DISCONTINUED | OUTPATIENT
Start: 2023-10-09 | End: 2023-10-09

## 2023-10-09 RX ORDER — FOLIC ACID 0.8 MG
1 TABLET ORAL DAILY
Refills: 0 | Status: DISCONTINUED | OUTPATIENT
Start: 2023-10-09 | End: 2023-10-12

## 2023-10-09 RX ORDER — CEFAZOLIN SODIUM 1 G
500 VIAL (EA) INJECTION EVERY 8 HOURS
Refills: 0 | Status: DISCONTINUED | OUTPATIENT
Start: 2023-10-09 | End: 2023-10-09

## 2023-10-09 RX ORDER — CEFAZOLIN SODIUM 1 G
2000 VIAL (EA) INJECTION EVERY 8 HOURS
Refills: 0 | Status: DISCONTINUED | OUTPATIENT
Start: 2023-10-09 | End: 2023-10-12

## 2023-10-09 RX ORDER — SODIUM CHLORIDE 9 MG/ML
500 INJECTION, SOLUTION INTRAVENOUS ONCE
Refills: 0 | Status: COMPLETED | OUTPATIENT
Start: 2023-10-09 | End: 2023-10-09

## 2023-10-09 RX ORDER — ONDANSETRON 8 MG/1
4 TABLET, FILM COATED ORAL EVERY 6 HOURS
Refills: 0 | Status: DISCONTINUED | OUTPATIENT
Start: 2023-10-09 | End: 2023-10-12

## 2023-10-09 RX ORDER — CEFAZOLIN SODIUM 1 G
2000 VIAL (EA) INJECTION ONCE
Refills: 0 | Status: DISCONTINUED | OUTPATIENT
Start: 2023-10-09 | End: 2023-10-09

## 2023-10-09 RX ORDER — FENTANYL CITRATE 50 UG/ML
50 INJECTION INTRAVENOUS
Refills: 0 | Status: DISCONTINUED | OUTPATIENT
Start: 2023-10-09 | End: 2023-10-10

## 2023-10-09 RX ORDER — TRAMADOL HYDROCHLORIDE 50 MG/1
50 TABLET ORAL EVERY 4 HOURS
Refills: 0 | Status: DISCONTINUED | OUTPATIENT
Start: 2023-10-09 | End: 2023-10-12

## 2023-10-09 RX ORDER — CEFAZOLIN SODIUM 1 G
VIAL (EA) INJECTION
Refills: 0 | Status: DISCONTINUED | OUTPATIENT
Start: 2023-10-09 | End: 2023-10-09

## 2023-10-09 RX ORDER — ASPIRIN/CALCIUM CARB/MAGNESIUM 324 MG
325 TABLET ORAL DAILY
Refills: 0 | Status: DISCONTINUED | OUTPATIENT
Start: 2023-10-10 | End: 2023-10-12

## 2023-10-09 RX ORDER — ACETAMINOPHEN 500 MG
1000 TABLET ORAL ONCE
Refills: 0 | Status: DISCONTINUED | OUTPATIENT
Start: 2023-10-09 | End: 2023-10-12

## 2023-10-09 RX ORDER — LOSARTAN POTASSIUM 100 MG/1
100 TABLET, FILM COATED ORAL DAILY
Refills: 0 | Status: DISCONTINUED | OUTPATIENT
Start: 2023-10-09 | End: 2023-10-12

## 2023-10-09 RX ORDER — SENNA PLUS 8.6 MG/1
2 TABLET ORAL AT BEDTIME
Refills: 0 | Status: DISCONTINUED | OUTPATIENT
Start: 2023-10-09 | End: 2023-10-12

## 2023-10-09 RX ORDER — CARVEDILOL PHOSPHATE 80 MG/1
6.25 CAPSULE, EXTENDED RELEASE ORAL EVERY 12 HOURS
Refills: 0 | Status: DISCONTINUED | OUTPATIENT
Start: 2023-10-09 | End: 2023-10-12

## 2023-10-09 RX ORDER — HYDROMORPHONE HYDROCHLORIDE 2 MG/ML
0.5 INJECTION INTRAMUSCULAR; INTRAVENOUS; SUBCUTANEOUS
Refills: 0 | Status: DISCONTINUED | OUTPATIENT
Start: 2023-10-09 | End: 2023-10-10

## 2023-10-09 RX ORDER — POLYETHYLENE GLYCOL 3350 17 G/17G
17 POWDER, FOR SOLUTION ORAL AT BEDTIME
Refills: 0 | Status: DISCONTINUED | OUTPATIENT
Start: 2023-10-09 | End: 2023-10-12

## 2023-10-09 RX ORDER — OXYCODONE HYDROCHLORIDE 5 MG/1
5 TABLET ORAL EVERY 4 HOURS
Refills: 0 | Status: DISCONTINUED | OUTPATIENT
Start: 2023-10-09 | End: 2023-10-12

## 2023-10-09 RX ORDER — SODIUM CHLORIDE 9 MG/ML
500 INJECTION, SOLUTION INTRAVENOUS ONCE
Refills: 0 | Status: COMPLETED | OUTPATIENT
Start: 2023-10-09 | End: 2023-10-10

## 2023-10-09 RX ADMIN — Medication 15 MILLIGRAM(S): at 04:50

## 2023-10-09 RX ADMIN — Medication 100 MILLIGRAM(S): at 22:46

## 2023-10-09 RX ADMIN — SODIUM CHLORIDE 125 MILLILITER(S): 9 INJECTION, SOLUTION INTRAVENOUS at 12:39

## 2023-10-09 RX ADMIN — Medication 1 PATCH: at 18:21

## 2023-10-09 RX ADMIN — SODIUM CHLORIDE 125 MILLILITER(S): 9 INJECTION, SOLUTION INTRAVENOUS at 18:07

## 2023-10-09 RX ADMIN — SODIUM CHLORIDE 75 MILLILITER(S): 9 INJECTION, SOLUTION INTRAVENOUS at 00:34

## 2023-10-09 RX ADMIN — Medication 650 MILLIGRAM(S): at 18:03

## 2023-10-09 RX ADMIN — SODIUM CHLORIDE 500 MILLILITER(S): 9 INJECTION, SOLUTION INTRAVENOUS at 13:49

## 2023-10-09 RX ADMIN — LOSARTAN POTASSIUM 100 MILLIGRAM(S): 100 TABLET, FILM COATED ORAL at 18:40

## 2023-10-09 RX ADMIN — Medication 2 MILLIGRAM(S): at 18:02

## 2023-10-09 RX ADMIN — Medication 1 MILLIGRAM(S): at 18:02

## 2023-10-09 RX ADMIN — Medication 1 TABLET(S): at 18:40

## 2023-10-09 RX ADMIN — SODIUM CHLORIDE 125 MILLILITER(S): 9 INJECTION, SOLUTION INTRAVENOUS at 23:23

## 2023-10-09 RX ADMIN — Medication 650 MILLIGRAM(S): at 18:43

## 2023-10-09 RX ADMIN — PANTOPRAZOLE SODIUM 40 MILLIGRAM(S): 20 TABLET, DELAYED RELEASE ORAL at 18:03

## 2023-10-09 RX ADMIN — CARVEDILOL PHOSPHATE 6.25 MILLIGRAM(S): 80 CAPSULE, EXTENDED RELEASE ORAL at 18:43

## 2023-10-09 RX ADMIN — SODIUM CHLORIDE 500 MILLILITER(S): 9 INJECTION, SOLUTION INTRAVENOUS at 18:12

## 2023-10-09 RX ADMIN — Medication 100 MILLIGRAM(S): at 18:02

## 2023-10-09 NOTE — ED ADULT NURSE REASSESSMENT NOTE - NS ED NURSE REASSESS COMMENT FT1
pt able to move right lower extremity toes. pt denies pain, or discomfort at this time. no acute distress noted. respirations even and unlabored. pt A&ox4. pt able to move right lower extremity toes. pt denies pain, or discomfort at this time. no acute distress noted. respirations even and unlabored. pt A&ox4. +sensations

## 2023-10-10 LAB
ALBUMIN SERPL ELPH-MCNC: 2.6 G/DL — LOW (ref 3.3–5)
ALP SERPL-CCNC: 83 U/L — SIGNIFICANT CHANGE UP (ref 40–120)
ALT FLD-CCNC: 29 U/L — SIGNIFICANT CHANGE UP (ref 12–78)
ANION GAP SERPL CALC-SCNC: 4 MMOL/L — LOW (ref 5–17)
ANION GAP SERPL CALC-SCNC: 5 MMOL/L — SIGNIFICANT CHANGE UP (ref 5–17)
AST SERPL-CCNC: 32 U/L — SIGNIFICANT CHANGE UP (ref 15–37)
BILIRUB SERPL-MCNC: 1.2 MG/DL — SIGNIFICANT CHANGE UP (ref 0.2–1.2)
BUN SERPL-MCNC: 15 MG/DL — SIGNIFICANT CHANGE UP (ref 7–23)
BUN SERPL-MCNC: 16 MG/DL — SIGNIFICANT CHANGE UP (ref 7–23)
CALCIUM SERPL-MCNC: 8.8 MG/DL — SIGNIFICANT CHANGE UP (ref 8.5–10.1)
CALCIUM SERPL-MCNC: 8.8 MG/DL — SIGNIFICANT CHANGE UP (ref 8.5–10.1)
CHLORIDE SERPL-SCNC: 110 MMOL/L — HIGH (ref 96–108)
CHLORIDE SERPL-SCNC: 111 MMOL/L — HIGH (ref 96–108)
CO2 SERPL-SCNC: 26 MMOL/L — SIGNIFICANT CHANGE UP (ref 22–31)
CO2 SERPL-SCNC: 26 MMOL/L — SIGNIFICANT CHANGE UP (ref 22–31)
CREAT SERPL-MCNC: 1.11 MG/DL — SIGNIFICANT CHANGE UP (ref 0.5–1.3)
CREAT SERPL-MCNC: 1.12 MG/DL — SIGNIFICANT CHANGE UP (ref 0.5–1.3)
EGFR: 67 ML/MIN/1.73M2 — SIGNIFICANT CHANGE UP
EGFR: 68 ML/MIN/1.73M2 — SIGNIFICANT CHANGE UP
GLUCOSE SERPL-MCNC: 104 MG/DL — HIGH (ref 70–99)
GLUCOSE SERPL-MCNC: 109 MG/DL — HIGH (ref 70–99)
HCT VFR BLD CALC: 42.2 % — SIGNIFICANT CHANGE UP (ref 39–50)
HGB BLD-MCNC: 14 G/DL — SIGNIFICANT CHANGE UP (ref 13–17)
MCHC RBC-ENTMCNC: 33.2 G/DL — SIGNIFICANT CHANGE UP (ref 32–36)
MCHC RBC-ENTMCNC: 33.2 PG — SIGNIFICANT CHANGE UP (ref 27–34)
MCV RBC AUTO: 100 FL — SIGNIFICANT CHANGE UP (ref 80–100)
NRBC # BLD: 0 /100 WBCS — SIGNIFICANT CHANGE UP (ref 0–0)
PLATELET # BLD AUTO: 181 K/UL — SIGNIFICANT CHANGE UP (ref 150–400)
POTASSIUM SERPL-MCNC: 3.7 MMOL/L — SIGNIFICANT CHANGE UP (ref 3.5–5.3)
POTASSIUM SERPL-MCNC: 3.7 MMOL/L — SIGNIFICANT CHANGE UP (ref 3.5–5.3)
POTASSIUM SERPL-SCNC: 3.7 MMOL/L — SIGNIFICANT CHANGE UP (ref 3.5–5.3)
POTASSIUM SERPL-SCNC: 3.7 MMOL/L — SIGNIFICANT CHANGE UP (ref 3.5–5.3)
PROT SERPL-MCNC: 6.2 GM/DL — SIGNIFICANT CHANGE UP (ref 6–8.3)
RBC # BLD: 4.22 M/UL — SIGNIFICANT CHANGE UP (ref 4.2–5.8)
RBC # FLD: 13.5 % — SIGNIFICANT CHANGE UP (ref 10.3–14.5)
SODIUM SERPL-SCNC: 141 MMOL/L — SIGNIFICANT CHANGE UP (ref 135–145)
SODIUM SERPL-SCNC: 141 MMOL/L — SIGNIFICANT CHANGE UP (ref 135–145)
WBC # BLD: 11.27 K/UL — HIGH (ref 3.8–10.5)
WBC # FLD AUTO: 11.27 K/UL — HIGH (ref 3.8–10.5)

## 2023-10-10 RX ADMIN — Medication 1 TABLET(S): at 12:51

## 2023-10-10 RX ADMIN — PANTOPRAZOLE SODIUM 40 MILLIGRAM(S): 20 TABLET, DELAYED RELEASE ORAL at 06:38

## 2023-10-10 RX ADMIN — HYDROMORPHONE HYDROCHLORIDE 0.5 MILLIGRAM(S): 2 INJECTION INTRAMUSCULAR; INTRAVENOUS; SUBCUTANEOUS at 09:47

## 2023-10-10 RX ADMIN — Medication 325 MILLIGRAM(S): at 14:49

## 2023-10-10 RX ADMIN — OXYCODONE HYDROCHLORIDE 5 MILLIGRAM(S): 5 TABLET ORAL at 21:15

## 2023-10-10 RX ADMIN — Medication 1 PATCH: at 07:50

## 2023-10-10 RX ADMIN — Medication 650 MILLIGRAM(S): at 16:53

## 2023-10-10 RX ADMIN — Medication 650 MILLIGRAM(S): at 12:50

## 2023-10-10 RX ADMIN — Medication 100 MILLIGRAM(S): at 12:51

## 2023-10-10 RX ADMIN — LOSARTAN POTASSIUM 100 MILLIGRAM(S): 100 TABLET, FILM COATED ORAL at 06:38

## 2023-10-10 RX ADMIN — OXYCODONE HYDROCHLORIDE 5 MILLIGRAM(S): 5 TABLET ORAL at 19:56

## 2023-10-10 RX ADMIN — Medication 1 MILLIGRAM(S): at 12:51

## 2023-10-10 RX ADMIN — Medication 100 MILLIGRAM(S): at 06:38

## 2023-10-10 RX ADMIN — CARVEDILOL PHOSPHATE 6.25 MILLIGRAM(S): 80 CAPSULE, EXTENDED RELEASE ORAL at 17:55

## 2023-10-10 RX ADMIN — Medication 100 MILLIGRAM(S): at 17:08

## 2023-10-10 RX ADMIN — CARVEDILOL PHOSPHATE 6.25 MILLIGRAM(S): 80 CAPSULE, EXTENDED RELEASE ORAL at 06:38

## 2023-10-10 RX ADMIN — HYDROMORPHONE HYDROCHLORIDE 0.5 MILLIGRAM(S): 2 INJECTION INTRAMUSCULAR; INTRAVENOUS; SUBCUTANEOUS at 08:47

## 2023-10-10 RX ADMIN — SODIUM CHLORIDE 500 MILLILITER(S): 9 INJECTION, SOLUTION INTRAVENOUS at 06:38

## 2023-10-10 NOTE — DISCHARGE NOTE PROVIDER - PROVIDER TOKENS
PROVIDER:[TOKEN:[[id number]]] PROVIDER:[TOKEN:[[id number]]],PROVIDER:[TOKEN:[[id number]]]

## 2023-10-10 NOTE — DISCHARGE NOTE PROVIDER - CARE PROVIDER_API CALL
Jonny Puri  Orthopaedic Surgery  [address] [place] [address], [facility] [address]  [place] [address]  Phone: [phone]  Fax: [phone]  Follow Up Time:    Jonny Puri  Orthopaedic Surgery  [address] [facility], [facility] [address]  [place] [address]  Phone: [phone]  Fax: [phone]  Follow Up Time:     Amilcar Whitehead  Internal Medicine  [address] [place] [address]  Phone: [phone]  Fax: [phone]  Follow Up Time:

## 2023-10-10 NOTE — DISCHARGE NOTE PROVIDER - NSDCMRMEDTOKEN_GEN_ALL_CORE_FT
carvedilol 6.25 mg oral tablet: 1 tab(s) orally every 12 hours  folic acid 1 mg oral tablet: 1 tab(s) orally once a day  losartan 100 mg oral tablet: 1 tab(s) orally once a day  Multiple Vitamins oral tablet: 1 tab(s) orally once a day  pantoprazole 40 mg oral delayed release tablet: 1 tab(s) orally once a day (before a meal)  thiamine 100 mg oral tablet: 1 tab(s) orally once a day

## 2023-10-10 NOTE — DISCHARGE NOTE PROVIDER - NSDCCPTREATMENT_GEN_ALL_CORE_FT
PRINCIPAL PROCEDURE  Procedure: Open reduction, fracture, ankle, trimalleolar  Findings and Treatment:

## 2023-10-10 NOTE — DISCHARGE NOTE PROVIDER - NSDCFUADDINST_GEN_ALL_CORE_FT
Discharge Instructions for Ankle ORIF    1) Keep your splint dry at all times.   2) DO NOT walk on the splint - absolutly NO WEIGHT BEARING at all on the operative leg.  3) Elevate the leg at all times when in chair or bed.  4) Be up and about as much as possible. Try not to sit around as long as you DO NOT put any weight on the surgical leg.  5) You will need crutches to get around.  6) Follow up with Dr. Puri in office in 2 weeks. Please call the office to make an appointment.   7) Take Daily EC Aspirin 325mg x 4 weeks to prevent blood clots.   8) Pain meds will be sent to your pharmacy electronically - pick them up on the way home and take them as needed.

## 2023-10-10 NOTE — OCCUPATIONAL THERAPY INITIAL EVALUATION ADULT - ADDITIONAL COMMENTS
Pt reports he lives with significant other (who can provide assist at home) in coop with 2 steps plus 3 steps with a rail to enter. Pt was independent with ADLs and mobility prior to admission.

## 2023-10-10 NOTE — DISCHARGE NOTE PROVIDER - HOSPITAL COURSE
***INCOMPLETE NOTE, CHARTING IN PROGRESS*** A/P  77 yo  w  male  with  hx  htn unsure  of  medications  daily  etoh consumtion of 2/3 hard  drinks  /day  admitted  with rt  trimalleolar fx  unclear  if  syncopal  episode or  etoh  intoxication  Syncope   Trimalleolar  fx    s/p  ORIFactivity as per  Ortho    HTN  hydralazine prn  for  now   cozaar  and  carvedilol    ETOHabuse    On10/12/23 this case was reviewed with Dr. Whitehead, the patient is medically stable and optimized for discharge. A/P  79 yo  w  male  with  hx  htn unsure  of  medications  daily  etoh consumtion admitted  with right  trimalleolar fx .    Syncope   Trimalleolar  fx    s/p  ORIF activity as per  Ortho    HTN  hydralazine prn  for  now   cozaar  and  carvedilol    ETOHabuse    On 10/12/23 this case was reviewed with Dr. Whitehead, the patient is medically stable and optimized for discharge. A/P  79 yo  w  male  with  hx  htn unsure  of  medications  daily  etoh consumption admitted  with right  trimalleolar fx .    Syncope   Trimalleolar  fx    s/p  ORIF activity as per  Ortho    HTN  hydralazine prn  for  now   cozaar  and  carvedilol    ETOHabuse    On 10/12/23 this case was reviewed with Dr. Whitehead, the patient is medically stable and optimized for discharge.

## 2023-10-10 NOTE — OCCUPATIONAL THERAPY INITIAL EVALUATION ADULT - ADL RETRAINING, OT EVAL
Pt will perform lower body dressing independently in 1-2 weeks Pt will perform lower body dressing independently in 2 weeks

## 2023-10-10 NOTE — DISCHARGE NOTE PROVIDER - NSDCCPCAREPLAN_GEN_ALL_CORE_FT
PRINCIPAL DISCHARGE DIAGNOSIS  Diagnosis: Trimalleolar fracture of right ankle  Assessment and Plan of Treatment:      PRINCIPAL DISCHARGE DIAGNOSIS  Diagnosis: Trimalleolar fracture of right ankle  Assessment and Plan of Treatment:       SECONDARY DISCHARGE DIAGNOSES  Diagnosis: Syncope  Assessment and Plan of Treatment:     Diagnosis: ETOH abuse  Assessment and Plan of Treatment:

## 2023-10-10 NOTE — PHYSICAL THERAPY INITIAL EVALUATION ADULT - ADDITIONAL COMMENTS
pt lives alone in a private co-op apt with 2 steps to enter, right rail and 3 steps to apt right rail. pt PLOF is indep in all ADL, transfers and ambulation w/o AD, pt admitted for trimalleolar fx of right ankle, s/p ORIF, NWB RLE  pt encountered in bed supine, aaox3, s/p ORIF of right LE ankle. + right eye visual deficit. pt require min a for transfers and able to amb with RW x 20 ft NWB, pt unable to negotiate steps with drake AC, assisted back to room

## 2023-10-10 NOTE — OCCUPATIONAL THERAPY INITIAL EVALUATION ADULT - NSOTDMEREC_GEN_A_CORE
I spoke with mother and confirmed the symptoms reported. Mother already has the omeprazole in hand and I suggested Bentyl for the postprandial abdominal pain. I sent this to the requested pharmacy.     Thank you, Cheree Lesches Pt recommended for rolling walker and 3:1 commode.

## 2023-10-10 NOTE — DISCHARGE NOTE PROVIDER - CARE PROVIDERS DIRECT ADDRESSES
,[email or web address]

## 2023-10-10 NOTE — OCCUPATIONAL THERAPY INITIAL EVALUATION ADULT - ASSISTIVE DEVICE FOR TRANSFER: STAND/SIT, REHAB EVAL
Quality 130: Documentation Of Current Medications In The Medical Record: Current Medications Documented rolling walker Detail Level: Generalized

## 2023-10-10 NOTE — OCCUPATIONAL THERAPY INITIAL EVALUATION ADULT - BALANCE TRAINING, PT EVAL
Pt will increase static standing balance to good with rolling walker to increase performance with ADls in 2 weeks

## 2023-10-10 NOTE — OCCUPATIONAL THERAPY INITIAL EVALUATION ADULT - GENERAL OBSERVATIONS, REHAB EVAL
Pt encountered supine in bed, NAD, ace wrap/splint to right ankle in place, cardiac monitor in place, pt had no pain s/p right ankle ORIF POD 1.

## 2023-10-11 ENCOUNTER — TRANSCRIPTION ENCOUNTER (OUTPATIENT)
Age: 78
End: 2023-10-11

## 2023-10-11 LAB
ANION GAP SERPL CALC-SCNC: 2 MMOL/L — LOW (ref 5–17)
BUN SERPL-MCNC: 12 MG/DL — SIGNIFICANT CHANGE UP (ref 7–23)
CALCIUM SERPL-MCNC: 8.7 MG/DL — SIGNIFICANT CHANGE UP (ref 8.5–10.1)
CHLORIDE SERPL-SCNC: 109 MMOL/L — HIGH (ref 96–108)
CO2 SERPL-SCNC: 31 MMOL/L — SIGNIFICANT CHANGE UP (ref 22–31)
CREAT SERPL-MCNC: 0.82 MG/DL — SIGNIFICANT CHANGE UP (ref 0.5–1.3)
EGFR: 90 ML/MIN/1.73M2 — SIGNIFICANT CHANGE UP
GLUCOSE SERPL-MCNC: 92 MG/DL — SIGNIFICANT CHANGE UP (ref 70–99)
HCT VFR BLD CALC: 41.3 % — SIGNIFICANT CHANGE UP (ref 39–50)
HGB BLD-MCNC: 13.7 G/DL — SIGNIFICANT CHANGE UP (ref 13–17)
MCHC RBC-ENTMCNC: 33.2 G/DL — SIGNIFICANT CHANGE UP (ref 32–36)
MCHC RBC-ENTMCNC: 34 PG — SIGNIFICANT CHANGE UP (ref 27–34)
MCV RBC AUTO: 102.5 FL — HIGH (ref 80–100)
NRBC # BLD: 0 /100 WBCS — SIGNIFICANT CHANGE UP (ref 0–0)
PLATELET # BLD AUTO: 178 K/UL — SIGNIFICANT CHANGE UP (ref 150–400)
POTASSIUM SERPL-MCNC: 3.7 MMOL/L — SIGNIFICANT CHANGE UP (ref 3.5–5.3)
POTASSIUM SERPL-SCNC: 3.7 MMOL/L — SIGNIFICANT CHANGE UP (ref 3.5–5.3)
RAPID RVP RESULT: SIGNIFICANT CHANGE UP
RBC # BLD: 4.03 M/UL — LOW (ref 4.2–5.8)
RBC # FLD: 13.5 % — SIGNIFICANT CHANGE UP (ref 10.3–14.5)
SARS-COV-2 RNA SPEC QL NAA+PROBE: SIGNIFICANT CHANGE UP
SODIUM SERPL-SCNC: 142 MMOL/L — SIGNIFICANT CHANGE UP (ref 135–145)
WBC # BLD: 9.07 K/UL — SIGNIFICANT CHANGE UP (ref 3.8–10.5)
WBC # FLD AUTO: 9.07 K/UL — SIGNIFICANT CHANGE UP (ref 3.8–10.5)

## 2023-10-11 RX ADMIN — PANTOPRAZOLE SODIUM 40 MILLIGRAM(S): 20 TABLET, DELAYED RELEASE ORAL at 05:21

## 2023-10-11 RX ADMIN — Medication 100 MILLIGRAM(S): at 14:29

## 2023-10-11 RX ADMIN — Medication 1 MILLIGRAM(S): at 12:14

## 2023-10-11 RX ADMIN — OXYCODONE HYDROCHLORIDE 5 MILLIGRAM(S): 5 TABLET ORAL at 22:30

## 2023-10-11 RX ADMIN — Medication 325 MILLIGRAM(S): at 12:13

## 2023-10-11 RX ADMIN — OXYCODONE HYDROCHLORIDE 5 MILLIGRAM(S): 5 TABLET ORAL at 23:15

## 2023-10-11 RX ADMIN — Medication 100 MILLIGRAM(S): at 22:25

## 2023-10-11 RX ADMIN — CARVEDILOL PHOSPHATE 6.25 MILLIGRAM(S): 80 CAPSULE, EXTENDED RELEASE ORAL at 17:29

## 2023-10-11 RX ADMIN — Medication 650 MILLIGRAM(S): at 17:29

## 2023-10-11 RX ADMIN — Medication 1 TABLET(S): at 12:14

## 2023-10-11 RX ADMIN — CARVEDILOL PHOSPHATE 6.25 MILLIGRAM(S): 80 CAPSULE, EXTENDED RELEASE ORAL at 05:20

## 2023-10-11 RX ADMIN — Medication 650 MILLIGRAM(S): at 18:08

## 2023-10-11 RX ADMIN — OXYCODONE HYDROCHLORIDE 5 MILLIGRAM(S): 5 TABLET ORAL at 05:20

## 2023-10-11 RX ADMIN — Medication 100 MILLIGRAM(S): at 12:14

## 2023-10-11 RX ADMIN — Medication 650 MILLIGRAM(S): at 12:14

## 2023-10-11 RX ADMIN — LOSARTAN POTASSIUM 100 MILLIGRAM(S): 100 TABLET, FILM COATED ORAL at 05:20

## 2023-10-11 RX ADMIN — Medication 100 MILLIGRAM(S): at 07:01

## 2023-10-11 NOTE — DISCHARGE NOTE NURSING/CASE MANAGEMENT/SOCIAL WORK - NSDCPEFALRISK_GEN_ALL_CORE
For information on Fall & Injury Prevention, visit: [email or web address] OR  [email or web address] OR  https://www.cdc.gov/steadi/patient.html

## 2023-10-11 NOTE — DISCHARGE NOTE NURSING/CASE MANAGEMENT/SOCIAL WORK - PATIENT PORTAL LINK FT
You can access the FollowMyHealth Patient Portal offered by [facility] by registering at the following website: [email or web address]. By joining [facility]’s FollowMyHealth portal, you will also be able to view your health information using other applications (apps) compatible with our system.

## 2023-10-12 VITALS
SYSTOLIC BLOOD PRESSURE: 154 MMHG | OXYGEN SATURATION: 96 % | HEART RATE: 77 BPM | TEMPERATURE: 98 F | DIASTOLIC BLOOD PRESSURE: 94 MMHG | RESPIRATION RATE: 18 BRPM

## 2023-10-12 LAB
ANION GAP SERPL CALC-SCNC: 2 MMOL/L — LOW (ref 5–17)
BUN SERPL-MCNC: 12 MG/DL — SIGNIFICANT CHANGE UP (ref 7–23)
CALCIUM SERPL-MCNC: 8.6 MG/DL — SIGNIFICANT CHANGE UP (ref 8.5–10.1)
CHLORIDE SERPL-SCNC: 108 MMOL/L — SIGNIFICANT CHANGE UP (ref 96–108)
CO2 SERPL-SCNC: 30 MMOL/L — SIGNIFICANT CHANGE UP (ref 22–31)
CREAT SERPL-MCNC: 0.7 MG/DL — SIGNIFICANT CHANGE UP (ref 0.5–1.3)
EGFR: 94 ML/MIN/1.73M2 — SIGNIFICANT CHANGE UP
GLUCOSE SERPL-MCNC: 93 MG/DL — SIGNIFICANT CHANGE UP (ref 70–99)
HCT VFR BLD CALC: 41.1 % — SIGNIFICANT CHANGE UP (ref 39–50)
HGB BLD-MCNC: 13.3 G/DL — SIGNIFICANT CHANGE UP (ref 13–17)
MCHC RBC-ENTMCNC: 32.4 G/DL — SIGNIFICANT CHANGE UP (ref 32–36)
MCHC RBC-ENTMCNC: 33.3 PG — SIGNIFICANT CHANGE UP (ref 27–34)
MCV RBC AUTO: 102.8 FL — HIGH (ref 80–100)
NRBC # BLD: 0 /100 WBCS — SIGNIFICANT CHANGE UP (ref 0–0)
PLATELET # BLD AUTO: 199 K/UL — SIGNIFICANT CHANGE UP (ref 150–400)
POTASSIUM SERPL-MCNC: 3.7 MMOL/L — SIGNIFICANT CHANGE UP (ref 3.5–5.3)
POTASSIUM SERPL-SCNC: 3.7 MMOL/L — SIGNIFICANT CHANGE UP (ref 3.5–5.3)
RBC # BLD: 4 M/UL — LOW (ref 4.2–5.8)
RBC # FLD: 13.6 % — SIGNIFICANT CHANGE UP (ref 10.3–14.5)
SODIUM SERPL-SCNC: 140 MMOL/L — SIGNIFICANT CHANGE UP (ref 135–145)
WBC # BLD: 7.88 K/UL — SIGNIFICANT CHANGE UP (ref 3.8–10.5)
WBC # FLD AUTO: 7.88 K/UL — SIGNIFICANT CHANGE UP (ref 3.8–10.5)

## 2023-10-12 RX ORDER — PANTOPRAZOLE SODIUM 20 MG/1
1 TABLET, DELAYED RELEASE ORAL
Qty: 0 | Refills: 0 | DISCHARGE
Start: 2023-10-12

## 2023-10-12 RX ORDER — FOLIC ACID 0.8 MG
1 TABLET ORAL
Qty: 0 | Refills: 0 | DISCHARGE
Start: 2023-10-12

## 2023-10-12 RX ORDER — LOSARTAN POTASSIUM 100 MG/1
1 TABLET, FILM COATED ORAL
Qty: 0 | Refills: 0 | DISCHARGE
Start: 2023-10-12

## 2023-10-12 RX ORDER — CARVEDILOL PHOSPHATE 80 MG/1
1 CAPSULE, EXTENDED RELEASE ORAL
Qty: 0 | Refills: 0 | DISCHARGE
Start: 2023-10-12

## 2023-10-12 RX ORDER — THIAMINE MONONITRATE (VIT B1) 100 MG
1 TABLET ORAL
Qty: 0 | Refills: 0 | DISCHARGE
Start: 2023-10-12

## 2023-10-12 RX ADMIN — Medication 650 MILLIGRAM(S): at 06:45

## 2023-10-12 RX ADMIN — Medication 100 MILLIGRAM(S): at 06:08

## 2023-10-12 RX ADMIN — CARVEDILOL PHOSPHATE 6.25 MILLIGRAM(S): 80 CAPSULE, EXTENDED RELEASE ORAL at 17:12

## 2023-10-12 RX ADMIN — Medication 1 TABLET(S): at 11:45

## 2023-10-12 RX ADMIN — Medication 650 MILLIGRAM(S): at 11:45

## 2023-10-12 RX ADMIN — CARVEDILOL PHOSPHATE 6.25 MILLIGRAM(S): 80 CAPSULE, EXTENDED RELEASE ORAL at 06:10

## 2023-10-12 RX ADMIN — Medication 650 MILLIGRAM(S): at 17:12

## 2023-10-12 RX ADMIN — Medication 325 MILLIGRAM(S): at 11:45

## 2023-10-12 RX ADMIN — Medication 1 MILLIGRAM(S): at 11:46

## 2023-10-12 RX ADMIN — TRAMADOL HYDROCHLORIDE 50 MILLIGRAM(S): 50 TABLET ORAL at 11:45

## 2023-10-12 RX ADMIN — Medication 100 MILLIGRAM(S): at 11:46

## 2023-10-12 RX ADMIN — PANTOPRAZOLE SODIUM 40 MILLIGRAM(S): 20 TABLET, DELAYED RELEASE ORAL at 06:11

## 2023-10-12 RX ADMIN — LOSARTAN POTASSIUM 100 MILLIGRAM(S): 100 TABLET, FILM COATED ORAL at 06:09

## 2023-10-12 RX ADMIN — Medication 650 MILLIGRAM(S): at 06:09

## 2023-10-12 NOTE — PROGRESS NOTE ADULT - SUBJECTIVE AND OBJECTIVE BOX
INTERVAL HPI/OVERNIGHT EVENTS:    s/p  orif  rt  ankle    REVIEW OF SYSTEMS:  CONSTITUTIONAL:  no  complaints    NECK: No pain or stiffnes  RESPIRATORY: No SOB   CARDIOVASCULAR: No chest pain, palpitations, dizziness,   GASTROINTESTINAL: No abdominal pain. No nausea, vomiting,   NEUROLOGICAL: No headaches, no  blurry  vision no  dizziness  SKIN: No itching,   MUSCULOSKELETAL: episodic  ankle pain    MEDICATION:  acetaminophen     Tablet .. 650 milliGRAM(s) Oral every 6 hours  acetaminophen   IVPB .. 1000 milliGRAM(s) IV Intermittent once  aspirin enteric coated 325 milliGRAM(s) Oral daily  carvedilol 6.25 milliGRAM(s) Oral every 12 hours  ceFAZolin   IVPB 2000 milliGRAM(s) IV Intermittent every 8 hours  cloNIDine Patch 0.2 mG/24Hr(s) 1 patch Transdermal every 7 days  folic acid 1 milliGRAM(s) Oral daily  losartan 100 milliGRAM(s) Oral daily  magnesium hydroxide Suspension 30 milliLiter(s) Oral daily PRN  multivitamin 1 Tablet(s) Oral daily  ondansetron Injectable 4 milliGRAM(s) IV Push every 6 hours PRN  oxyCODONE    IR 5 milliGRAM(s) Oral every 4 hours PRN  oxyCODONE    IR 2.5 milliGRAM(s) Oral every 4 hours PRN  pantoprazole    Tablet 40 milliGRAM(s) Oral before breakfast  polyethylene glycol 3350 17 Gram(s) Oral at bedtime  senna 2 Tablet(s) Oral at bedtime  thiamine 100 milliGRAM(s) Oral daily  traMADol 50 milliGRAM(s) Oral every 4 hours PRN    Vital Signs Last 24 Hrs  T(C): 37.3 (10 Oct 2023 10:53), Max: 37.3 (10 Oct 2023 10:53)  T(F): 99.1 (10 Oct 2023 10:53), Max: 99.1 (10 Oct 2023 10:53)  HR: 91 (10 Oct 2023 11:25) (74 - 94)  BP: 138/91 (10 Oct 2023 11:25) (91/72 - 165/105)  BP(mean): --  RR: 17 (10 Oct 2023 10:53) (10 - 20)  SpO2: 95% (10 Oct 2023 11:25) (92% - 98%)    Parameters below as of 10 Oct 2023 11:25  Patient On (Oxygen Delivery Method): room air        PHYSICAL EXAM:  GENERAL: NAD, well-groomed, well-developed  HEENT : Conjuntivae  clear sclerae anicteric rt  nasal  dry  excoriation    NECK: Supple, No JVD, Normal thyroid  NERVOUS SYSTEM:  Alert oriented   no  focal  deficits;   CHEST/LUNG: Clear    HEART: Regular rate and rhythm; No murmurs, rubs, or gallops  ABDOMEN: Soft, Nontender, Nondistended; Bowel sounds present  EXTREMITIES:  no  edema no  tenderness  SKIN: No rashes   LABS:                        15.5   11.89 )-----------( 180      ( 09 Oct 2023 13:01 )             45.1     10-09    140  |  111<H>  |  21  ----------------------------<  112<H>  4.4   |  22  |  1.06    Ca    8.8      09 Oct 2023 13:01  Phos  2.7     10-09  Mg     2.0     10-09    TPro  7.2  /  Alb  3.1<L>  /  TBili  3.0<H>  /  DBili  x   /  AST  33  /  ALT  24  /  AlkPhos  96  10-09    PT/INR - ( 09 Oct 2023 05:21 )   PT: 11.1 sec;   INR: 0.92 ratio         PTT - ( 09 Oct 2023 05:21 )  PTT:34.3 sec  Urinalysis Basic - ( 09 Oct 2023 13:01 )    Color: x / Appearance: x / SG: x / pH: x  Gluc: 112 mg/dL / Ketone: x  / Bili: x / Urobili: x   Blood: x / Protein: x / Nitrite: x   Leuk Esterase: x / RBC: x / WBC x   Sq Epi: x / Non Sq Epi: x / Bacteria: x      CAPILLARY BLOOD GLUCOSE          RADIOLOGY & ADDITIONAL TESTS:    Imaging reports  Personally Reviewed:  [ ] YES  [ ] NO    Consultant(s) Notes Reviewed:  [x ] YES  [ ] NO    Care Discussed with Consultants/Other Providers [x ] YES  [ ] NO  A/P  79 yo  w  male  with  hx  htn unsure  of  medications  daily  etoh consumtion of 2/3 hard  drinks  /day  admitted  with rt  trimalleolar fx  unclear  if  syncopal  episode or  etoh  intoxication    # trimalleolar  fx    dvt prophylaxis  pain management  s/p  ORIF  DVT  prophylax  pain management  activity as per  Ortho    # HTN  will  treat  with  Increased  clonidine  patch  to 0.2 mcg/24 hour hydralazine prn  for  now     restart  home  meds cozaar  and  carvediol can  d/c  clonidine patch    # etoh  use /abuse  will monitor with  symptom driven CIWA protocol  #possible  syncope    patient  now  states  that  he  fell and  fx  occurred  as he  twisted his  ankle  leucocytosis  probably  stress induced  continue  Ancef  empirically  for now  
    Pt seen and examined at the bedside. Pain is well controlled at this time, no concerns at this time.     Vital Signs Last 24 Hrs  T(C): 36.8 (10 Oct 2023 02:15), Max: 37.1 (09 Oct 2023 12:31)  T(F): 98.2 (10 Oct 2023 02:15), Max: 98.7 (09 Oct 2023 12:31)  HR: 74 (10 Oct 2023 02:15) (74 - 98)  BP: 144/96 (10 Oct 2023 02:15) (91/72 - 164/99)  BP(mean): --  RR: 18 (10 Oct 2023 02:15) (10 - 22)  SpO2: 95% (10 Oct 2023 02:15) (92% - 98%)    Parameters below as of 10 Oct 2023 02:15  Patient On (Oxygen Delivery Method): room air                             15.5   11.89 )-----------( 180      ( 09 Oct 2023 13:01 )             45.1       10-09    140  |  111<H>  |  21  ----------------------------<  112<H>  4.4   |  22  |  1.06    Ca    8.8      09 Oct 2023 13:01  Phos  2.7     10-09  Mg     2.0     10-09    TPro  7.2  /  Alb  3.1<L>  /  TBili  3.0<H>  /  DBili  x   /  AST  33  /  ALT  24  /  AlkPhos  96  10-09              PT/INR - ( 09 Oct 2023 05:21 )   PT: 11.1 sec;   INR: 0.92 ratio         PTT - ( 09 Oct 2023 05:21 )  PTT:34.3 sec      Exam:  GEN: NAD, awake and alert.  RLE  Trilam splint in place  Moving all toes appropriately   SILT to toes, and all exposed portion of foot  +good cap refill  WWP  Compartments soft and compressible where can be assessed in splint      A/P:  78y M s/p R Ankle ORIF POD #1    -CIWA monitoring for withdrawal  -Pain control prn  - DVT ppx  - NWB RLE in trlam splint  - PT/OOB as tolerated   - FU am labs  - Med co mgmt, continue home meds.  - FU PT Eval for dispo planning
    INTERVAL HPI/OVERNIGHT EVENTS:        REVIEW OF SYSTEMS:  CONSTITUTIONAL: feels  well  no  complaints    NECK: No pain or stiffnes  RESPIRATORY: No SOB   CARDIOVASCULAR: No chest pain, palpitations, dizziness,   GASTROINTESTINAL: No abdominal pain. No nausea, vomiting,   NEUROLOGICAL: No headaches, no  blurry  vision no  dizziness  SKIN: No itching,   MUSCULOSKELETAL: No pain    MEDICATION:  acetaminophen     Tablet .. 650 milliGRAM(s) Oral every 6 hours  acetaminophen   IVPB .. 1000 milliGRAM(s) IV Intermittent once  aspirin enteric coated 325 milliGRAM(s) Oral daily  carvedilol 6.25 milliGRAM(s) Oral every 12 hours  folic acid 1 milliGRAM(s) Oral daily  losartan 100 milliGRAM(s) Oral daily  magnesium hydroxide Suspension 30 milliLiter(s) Oral daily PRN  multivitamin 1 Tablet(s) Oral daily  ondansetron Injectable 4 milliGRAM(s) IV Push every 6 hours PRN  oxyCODONE    IR 5 milliGRAM(s) Oral every 4 hours PRN  oxyCODONE    IR 2.5 milliGRAM(s) Oral every 4 hours PRN  pantoprazole    Tablet 40 milliGRAM(s) Oral before breakfast  polyethylene glycol 3350 17 Gram(s) Oral at bedtime  senna 2 Tablet(s) Oral at bedtime  thiamine 100 milliGRAM(s) Oral daily  traMADol 50 milliGRAM(s) Oral every 4 hours PRN    Vital Signs Last 24 Hrs  T(C): 36.9 (12 Oct 2023 04:57), Max: 37 (11 Oct 2023 23:30)  T(F): 98.4 (12 Oct 2023 04:57), Max: 98.6 (11 Oct 2023 23:30)  HR: 65 (12 Oct 2023 04:57) (65 - 85)  BP: 169/98 (12 Oct 2023 04:57) (124/76 - 169/98)  BP(mean): --  RR: 18 (12 Oct 2023 04:57) (18 - 18)  SpO2: 95% (12 Oct 2023 04:57) (95% - 96%)    Parameters below as of 12 Oct 2023 04:57  Patient On (Oxygen Delivery Method): room air        PHYSICAL EXAM:  GENERAL: NAD, well-groomed, well-developed  HEENT : Conjuntivae  clear sclerae anicteric  NECK: Supple, No JVD, Normal thyroid  NERVOUS SYSTEM:  Alert oriented   no  focal  deficits;   CHEST/LUNG: Clear    HEART: Regular rate and rhythm; No murmurs, rubs, or gallops  ABDOMEN: Soft, Nontender, Nondistended; Bowel sounds present  EXTREMITIES:  no  edema no  tenderness  SKIN: No rashes   LABS:                        13.3   7.88  )-----------( 199      ( 12 Oct 2023 05:38 )             41.1     10-12    140  |  108  |  12  ----------------------------<  93  3.7   |  30  |  0.70    Ca    8.6      12 Oct 2023 05:38    TPro  6.2  /  Alb  2.6<L>  /  TBili  1.2  /  DBili  x   /  AST  32  /  ALT  29  /  AlkPhos  83  10-10      Urinalysis Basic - ( 12 Oct 2023 05:38 )    Color: x / Appearance: x / SG: x / pH: x  Gluc: 93 mg/dL / Ketone: x  / Bili: x / Urobili: x   Blood: x / Protein: x / Nitrite: x   Leuk Esterase: x / RBC: x / WBC x   Sq Epi: x / Non Sq Epi: x / Bacteria: x      CAPILLARY BLOOD GLUCOSE          RADIOLOGY & ADDITIONAL TESTS:    Imaging reports  Personally Reviewed:  [ ] YES  [ ] NO    Consultant(s) Notes Reviewed:  [ x] YES  [ ] NO    Care Discussed with Consultants/Other Providers [x ] YES  [ ] NO  A/P  77 yo  w  male  with  hx  htn unsure  of  medications  daily  etoh consumtion of 2/3 hard  drinks  /day  admitted  with rt  trimalleolar fx  unclear  if  syncopal  episode or  etoh  intoxication    # trimalleolar  fx    dvt prophylaxis  pain management  s/p  ORIF  DVT  prophylax  pain management  activity as per  Ortho    # HTN  will  treat  with  Increased  clonidine  patch  to 0.2 mcg/24 hour hydralazine prn  for  now     restart  home  meds cozaar  and  carvediol can  d/c  clonidine patch    # etoh  use /abuse  will monitor with  symptom driven CIWA protocol  #possible  syncope    patient  now  states  that  he  fell and  fx  occurred  as he  twisted his  ankle  leucocytosis now  resolved   possibly   stress induced  continue  Ancef  for  5  days  empirically   discharge to rehab          
    INTERVAL HPI/OVERNIGHT EVENTS:    no triggers  of  etoh withdrawal     REVIEW OF SYSTEMS:  CONSTITUTIONAL: feels well   slept  well  mild  rt  ankle  pain      NECK: No pain or stiffnes  RESPIRATORY: No SOB   CARDIOVASCULAR: No chest pain, palpitations, dizziness,   GASTROINTESTINAL: No abdominal pain. No nausea, vomiting,   NEUROLOGICAL: No headaches, no  blurry  vision no  dizziness  SKIN: No itching,   MUSCULOSKELETAL:   rt  ankle  pain    MEDICATION:  chlordiazePOXIDE 25 milliGRAM(s) Oral daily  cloNIDine Patch 0.1 mG/24Hr(s) 1 patch Transdermal every 7 days  hydrALAZINE Injectable 10 milliGRAM(s) IV Push every 8 hours PRN  ketorolac   Injectable 15 milliGRAM(s) IV Push every 8 hours PRN  lactated ringers. 1000 milliLiter(s) IV Continuous <Continuous>  LORazepam   Injectable 1 milliGRAM(s) IV Push every 1 hour PRN  pantoprazole  Injectable 40 milliGRAM(s) IV Push daily  thiamine 100 milliGRAM(s) Oral daily  thiamine Injectable 100 milliGRAM(s) IntraMuscular once    Vital Signs Last 24 Hrs  T(C): 36.9 (09 Oct 2023 05:24), Max: 37.3 (08 Oct 2023 19:52)  T(F): 98.4 (09 Oct 2023 05:24), Max: 99.2 (08 Oct 2023 19:52)  HR: 103 (09 Oct 2023 05:24) (76 - 127)  BP: 150/105 (09 Oct 2023 05:24) (124/92 - 170/99)  BP(mean): 118 (09 Oct 2023 05:24) (11 - 118)  RR: 20 (09 Oct 2023 05:24) (16 - 25)  SpO2: 98% (09 Oct 2023 05:24) (94% - 100%)    Parameters below as of 09 Oct 2023 05:24  Patient On (Oxygen Delivery Method): room air        PHYSICAL EXAM:  GENERAL: NAD, well-groomed, well-developed  HEENT : Conjuntivae  clear sclerae anicteric  NECK: Supple, No JVD, Normal thyroid  NERVOUS SYSTEM:  Alert oriented   no  focal  deficits;   CHEST/LUNG: Clear    HEART: Regular rate and rhythm; No murmurs, rubs, or gallops  ABDOMEN: Soft, Nontender, Nondistended; Bowel sounds present  EXTREMITIES:  rt ankle inn plaster splint  SKIN: No rashes   LABS:                        17.1   12.71 )-----------( 145      ( 09 Oct 2023 05:21 )             50.0     10-09    139  |  105  |  18  ----------------------------<  86  4.2   |  24  |  0.87    Ca    9.4      09 Oct 2023 05:21  Phos  2.7     10-09  Mg     2.0     10-09    TPro  7.2  /  Alb  3.1<L>  /  TBili  3.0<H>  /  DBili  x   /  AST  33  /  ALT  24  /  AlkPhos  96  10-09    PT/INR - ( 09 Oct 2023 05:21 )   PT: 11.1 sec;   INR: 0.92 ratio         PTT - ( 09 Oct 2023 05:21 )  PTT:34.3 sec  Urinalysis Basic - ( 09 Oct 2023 05:21 )    Color: x / Appearance: x / SG: x / pH: x  Gluc: 86 mg/dL / Ketone: x  / Bili: x / Urobili: x   Blood: x / Protein: x / Nitrite: x   Leuk Esterase: x / RBC: x / WBC x   Sq Epi: x / Non Sq Epi: x / Bacteria: x    ekg nsr 74/min  lahb  no  t/st  changes  CAPILLARY BLOOD GLUCOSE          RADIOLOGY & ADDITIONAL TESTS:    Imaging reports  Personally Reviewed:  [ x] YES  [ ] NO    Consultant(s) Notes Reviewed:  [ x] YES  [ ] NO    Care Discussed with Consultants/Other Providers [ x] YES  [ ] NO  A/P  77 yo  w  male  with  hx  htn unsure  of  medications  daily  etoh consumtion of 2/3 hard  drinks  /day  admitted  with rt  trimalleolar fx  unclear  if  syncopal  episode or  etoh  intoxication    # trimalleolar  fx    dvt prophylaxis  pain management  further Rx  as per ortho    # HTN  will  treat  with  Increased  clonidine  patch  to 0.2 mcg/24 hour hydralazine prn  for  now    will restart  home  meds  post  surgery  and  once   clarified    # etoh  use /abuse  will monitor with  symptom driven CIWA protocol  #possible  syncope  c   monitor  on  telemetry further w/u  and  Rx  as per  clinical course  leucocytosis  probably  stress induced  continue  Ancef  empirically  for now  medically  cleared  for  surgery     
    INTERVAL HPI/OVERNIGHT EVENTS:        REVIEW OF SYSTEMS:  CONSTITUTIONAL:  feels  well  no  complaints    NECK: No pain or stiffnes  RESPIRATORY: No SOB   CARDIOVASCULAR: No chest pain, palpitations, dizziness,   GASTROINTESTINAL: No abdominal pain. No nausea, vomiting,   NEUROLOGICAL: No headaches, no  blurry  vision no  dizziness  SKIN: No itching,   MUSCULOSKELETAL: rt  ankle  pain    MEDICATION:  acetaminophen     Tablet .. 650 milliGRAM(s) Oral every 6 hours  acetaminophen   IVPB .. 1000 milliGRAM(s) IV Intermittent once  aspirin enteric coated 325 milliGRAM(s) Oral daily  carvedilol 6.25 milliGRAM(s) Oral every 12 hours  ceFAZolin   IVPB 2000 milliGRAM(s) IV Intermittent every 8 hours  folic acid 1 milliGRAM(s) Oral daily  losartan 100 milliGRAM(s) Oral daily  magnesium hydroxide Suspension 30 milliLiter(s) Oral daily PRN  multivitamin 1 Tablet(s) Oral daily  ondansetron Injectable 4 milliGRAM(s) IV Push every 6 hours PRN  oxyCODONE    IR 5 milliGRAM(s) Oral every 4 hours PRN  oxyCODONE    IR 2.5 milliGRAM(s) Oral every 4 hours PRN  pantoprazole    Tablet 40 milliGRAM(s) Oral before breakfast  polyethylene glycol 3350 17 Gram(s) Oral at bedtime  senna 2 Tablet(s) Oral at bedtime  thiamine 100 milliGRAM(s) Oral daily  traMADol 50 milliGRAM(s) Oral every 4 hours PRN    Vital Signs Last 24 Hrs  T(C): 37.1 (11 Oct 2023 04:15), Max: 37.3 (10 Oct 2023 10:53)  T(F): 98.7 (11 Oct 2023 04:15), Max: 99.1 (10 Oct 2023 10:53)  HR: 81 (11 Oct 2023 04:15) (70 - 91)  BP: 148/97 (11 Oct 2023 04:15) (135/86 - 157/103)  BP(mean): --  RR: 18 (11 Oct 2023 04:15) (17 - 20)  SpO2: 95% (11 Oct 2023 04:15) (94% - 97%)    Parameters below as of 11 Oct 2023 04:15  Patient On (Oxygen Delivery Method): room air        PHYSICAL EXAM:  GENERAL: NAD, well-groomed, well-developed  HEENT : Conjuntivae  clear sclerae anicteric  NECK: Supple, No JVD, Normal thyroid  NERVOUS SYSTEM:  Alert oriented   no  focal  deficits;   CHEST/LUNG: Clear    HEART: Regular rate and rhythm; No murmurs, rubs, or gallops  ABDOMEN: Soft, Nontender, Nondistended; Bowel sounds present  EXTREMITIES:  lt  ankle  cast  SKIN: No rashes   LABS:                        13.7   9.07  )-----------( 178      ( 11 Oct 2023 06:50 )             41.3     10-11    142  |  109<H>  |  12  ----------------------------<  92  3.7   |  31  |  0.82    Ca    8.7      11 Oct 2023 06:50    TPro  6.2  /  Alb  2.6<L>  /  TBili  1.2  /  DBili  x   /  AST  32  /  ALT  29  /  AlkPhos  83  10-10      Urinalysis Basic - ( 11 Oct 2023 06:50 )    Color: x / Appearance: x / SG: x / pH: x  Gluc: 92 mg/dL / Ketone: x  / Bili: x / Urobili: x   Blood: x / Protein: x / Nitrite: x   Leuk Esterase: x / RBC: x / WBC x   Sq Epi: x / Non Sq Epi: x / Bacteria: x      CAPILLARY BLOOD GLUCOSE          RADIOLOGY & ADDITIONAL TESTS:    Imaging reports  Personally Reviewed:  [ ] YES  [ ] NO    Consultant(s) Notes Reviewed:  [x ] YES  [ ] NO    Care Discussed with Consultants/Other Providers [ x] YES  [ ] NO  A/P  77 yo  w  male  with  hx  htn unsure  of  medications  daily  etoh consumtion of 2/3 hard  drinks  /day  admitted  with rt  trimalleolar fx  unclear  if  syncopal  episode or  etoh  intoxication    # trimalleolar  fx    dvt prophylaxis  pain management  s/p  ORIF  DVT  prophylax  pain management  activity as per  Ortho    # HTN  will  treat  with  Increased  clonidine  patch  to 0.2 mcg/24 hour hydralazine prn  for  now     restart  home  meds cozaar  and  carvediol can  d/c  clonidine patch    # etoh  use /abuse  will monitor with  symptom driven CIWA protocol  #possible  syncope    patient  now  states  that  he  fell and  fx  occurred  as he  twisted his  ankle  leucocytosis now  resolved   possibly   stress induced  continue  Ancef  for  5 n days  empirically   discharge to rehab      
Orthopedics Post-op Check    Pt seen and examined at the bedside. Pain is well controlled at this time, no concerns at this time.     Vital Signs Last 24 Hrs  T(C): 37.1 (10-09-23 @ 12:31), Max: 37.3 (10-08-23 @ 19:52)  T(F): 98.7 (10-09-23 @ 12:31), Max: 99.2 (10-08-23 @ 19:52)  HR: 90 (10-09-23 @ 12:46) (90 - 127)  BP: 98/75 (10-09-23 @ 12:46) (91/72 - 150/105)  BP(mean): 118 (10-09-23 @ 05:24) (11 - 118)  RR: 11 (10-09-23 @ 12:46) (10 - 25)  SpO2: 98% (10-09-23 @ 12:46) (93% - 100%)      PT/INR - ( 09 Oct 2023 05:21 )   PT: 11.1 sec;   INR: 0.92 ratio         PTT - ( 09 Oct 2023 05:21 )  PTT:34.3 sec    Exam:  GEN: NAD, awake and alert.  RLE  Trilam splint in place  Moving all toes appropriately   SILT to toes  +good cap refill/perfusion  Compartments soft and compressible where can be assessed in splint      A/P:  78y M s/p R Ankle ORIF POD #0    -CIWA monitoring for withdrawal  -Pain control prn  - DVT ppx  - NWB RLE in trlam splint  - PT/OOB as tolerated   - FU am labs  - Med co mgmt, continue home meds.  - FU PT Eval for dispo planning
Patient seen and examined at bedside. Pain well controlled with medication. Patient denies any numbness, tingling, weakness, or any other orthopaedic complaint.     Vital Signs (24 Hrs):  T(C): 36.9 (10-12-23 @ 04:57), Max: 37 (10-11-23 @ 23:30)  HR: 65 (10-12-23 @ 04:57) (65 - 85)  BP: 169/98 (10-12-23 @ 04:57) (124/76 - 169/98)  RR: 18 (10-12-23 @ 04:57) (18 - 18)  SpO2: 95% (10-12-23 @ 04:57) (95% - 96%)  Wt(kg): --    LABS:                          13.7   9.07  )-----------( 178      ( 11 Oct 2023 06:50 )             41.3     10-11    142  |  109<H>  |  12  ----------------------------<  92  3.7   |  31  |  0.82    Ca    8.7      11 Oct 2023 06:50    TPro  6.2  /  Alb  2.6<L>  /  TBili  1.2  /  DBili  x   /  AST  32  /  ALT  29  /  AlkPhos  83  10-10    LIVER FUNCTIONS - ( 10 Oct 2023 12:25 )  Alb: 2.6 g/dL / Pro: 6.2 gm/dL / ALK PHOS: 83 U/L / ALT: 29 U/L / AST: 32 U/L / GGT: x             Exam:  GEN: NAD, awake and alert.  RLE  Trilam splint in place  Moving all toes appropriately   SILT to toes, and all exposed portion of foot  No pain with passive flexion/extension of toes  +good cap refill  WWP  Compartments soft and compressible where can be assessed in splint      A/P:  78y M s/p R Ankle ORIF POD #3    -CIWA monitoring for withdrawal  -Pain control prn  - DVT ppx  - NWB RLE in trlam splint  - PT/OOB as tolerated   - FU am labs  - Med co mgmt, continue home meds.  - Dispo to [facility] per PT eval  - Will discuss with Dr. Puri and adjust plan as needed  
Patient seen and examined at bedside. Pain well controlled with medication. Patient denies any numbness, tingling, weakness, or any other orthopaedic complaint.     Vital Signs Last 24 Hrs  T(C): 37.1 (11 Oct 2023 04:15), Max: 37.3 (10 Oct 2023 10:53)  T(F): 98.7 (11 Oct 2023 04:15), Max: 99.1 (10 Oct 2023 10:53)  HR: 81 (11 Oct 2023 04:15) (70 - 91)  BP: 148/97 (11 Oct 2023 04:15) (135/86 - 165/105)  BP(mean): --  RR: 18 (11 Oct 2023 04:15) (17 - 20)  SpO2: 95% (11 Oct 2023 04:15) (94% - 97%)    Parameters below as of 11 Oct 2023 04:15  Patient On (Oxygen Delivery Method): room air      Exam:  GEN: NAD, awake and alert.  RLE  Trilam splint in place  Moving all toes appropriately   SILT to toes, and all exposed portion of foot  +good cap refill  WWP  Compartments soft and compressible where can be assessed in splint                          14.0   11.27 )-----------( 181      ( 10 Oct 2023 12:25 )             42.2   10-10    141  |  111<H>  |  16  ----------------------------<  109<H>  3.7   |  26  |  1.12    Ca    8.8      10 Oct 2023 12:25    TPro  6.2  /  Alb  2.6<L>  /  TBili  1.2  /  DBili  x   /  AST  32  /  ALT  29  /  AlkPhos  83  10-10      A/P:  78y M s/p R Ankle ORIF POD #2    -CIWA monitoring for withdrawal  -Pain control prn  - DVT ppx  - NWB RLE in trlam splint  - PT/OOB as tolerated   - FU am labs  - Med co mgmt, continue home meds.  - FU PT Eval for dispo planning
Pt seen at bedside this AM. No acute complaints, pain is well managed. Denies numbness, tingling, fevers, chills, CP, SOB, N/V/D.    Vital Signs (24 Hrs):  T(C): 36.9 (10-09-23 @ 05:24), Max: 37.3 (10-08-23 @ 19:52)  HR: 103 (10-09-23 @ 05:24) (76 - 127)  BP: 150/105 (10-09-23 @ 05:24) (124/92 - 170/99)  RR: 20 (10-09-23 @ 05:24) (16 - 25)  SpO2: 98% (10-09-23 @ 05:24) (94% - 100%)  Wt(kg): --    LABS:                          17.1   12.71 )-----------( 145      ( 09 Oct 2023 05:21 )             50.0     10-09    139  |  105  |  18  ----------------------------<  86  4.2   |  24  |  0.87    Ca    9.4      09 Oct 2023 05:21  Phos  2.7     10-09  Mg     2.0     10-09    TPro  7.2  /  Alb  3.1<L>  /  TBili  3.0<H>  /  DBili  x   /  AST  33  /  ALT  24  /  AlkPhos  96  10-09    LIVER FUNCTIONS - ( 09 Oct 2023 05:21 )  Alb: 3.1 g/dL / Pro: 7.2 gm/dL / ALK PHOS: 96 U/L / ALT: 24 U/L / AST: 33 U/L / GGT: x           PT/INR - ( 09 Oct 2023 05:21 )   PT: 11.1 sec;   INR: 0.92 ratio         PTT - ( 09 Oct 2023 05:21 )  PTT:34.3 sec    Physical Exam:  General: NAD, laying in bed comfortably  Pt is AAOx3    RLE:   Splint in place, C/D/I  Grossly wiggles toes  SILT L2-S1  No calf tenderness    Accessible compartments soft and compressible  Capillary refill brisk    A/P  78M with R trimal fx    Plan for OR today   NWB, splint in place  Analgesics  NPO except medications  Hold chemical DVT ppx, SCDs OK  Please document medical clearance/optimization for OR  Will discuss plan with Dr. Puri and notify primary team of any changes to plan

## 2023-10-12 NOTE — PROGRESS NOTE ADULT - PROVIDER SPECIALTY LIST ADULT
Internal Medicine
Internal Medicine
Orthopedics
Internal Medicine
Internal Medicine

## 2023-10-12 NOTE — PROGRESS NOTE ADULT - REASON FOR ADMISSION
trimalleolar  fx  rt  ankle

## 2023-10-17 PROBLEM — H54.40 BLINDNESS, ONE EYE, UNSPECIFIED EYE: Chronic | Status: ACTIVE | Noted: 2023-10-08

## 2023-10-17 PROBLEM — I10 ESSENTIAL (PRIMARY) HYPERTENSION: Chronic | Status: ACTIVE | Noted: 2023-10-08
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
## 2023-10-19 DIAGNOSIS — R78.0 FINDING OF ALCOHOL IN BLOOD: ICD-10-CM

## 2023-10-19 DIAGNOSIS — I10 ESSENTIAL (PRIMARY) HYPERTENSION: ICD-10-CM

## 2023-10-19 DIAGNOSIS — S82.851A DISPLACED TRIMALLEOLAR FRACTURE OF RIGHT LOWER LEG, INITIAL ENCOUNTER FOR CLOSED FRACTURE: ICD-10-CM
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
## 2023-10-19 DIAGNOSIS — R55 SYNCOPE AND COLLAPSE: ICD-10-CM

## 2023-10-19 DIAGNOSIS — F10.10 ALCOHOL ABUSE, UNCOMPLICATED: ICD-10-CM

## 2023-10-26 DIAGNOSIS — Z00.00 ENCOUNTER FOR GENERAL ADULT MEDICAL EXAMINATION W/OUT ABNORMAL FINDINGS: ICD-10-CM

## 2023-11-01 ENCOUNTER — APPOINTMENT (OUTPATIENT)
Dept: ORTHOPEDIC SURGERY | Facility: CLINIC | Age: 78
End: 2023-11-01
Payer: MEDICARE

## 2023-11-01 PROCEDURE — 73610 X-RAY EXAM OF ANKLE: CPT | Mod: RT

## 2023-11-01 PROCEDURE — 99024 POSTOP FOLLOW-UP VISIT: CPT

## 2023-11-03 NOTE — DISCHARGE NOTE PROVIDER - YES NO FOR MLM POSITIVE OR NEGATIVE COVID RESULT
Attempted outreach for CC needs, DM, CHF and COPD management , RPM monitoring  No answer and unable to LVMs at this time
,

## 2023-11-15 ENCOUNTER — APPOINTMENT (OUTPATIENT)
Dept: ORTHOPEDIC SURGERY | Facility: CLINIC | Age: 78
End: 2023-11-15
Payer: MEDICARE

## 2023-11-15 PROCEDURE — 73610 X-RAY EXAM OF ANKLE: CPT | Mod: RT

## 2023-11-15 PROCEDURE — 99024 POSTOP FOLLOW-UP VISIT: CPT

## 2023-12-15 ENCOUNTER — APPOINTMENT (OUTPATIENT)
Dept: ORTHOPEDIC SURGERY | Facility: CLINIC | Age: 78
End: 2023-12-15
Payer: MEDICARE

## 2023-12-15 PROCEDURE — 99024 POSTOP FOLLOW-UP VISIT: CPT

## 2023-12-15 PROCEDURE — 73610 X-RAY EXAM OF ANKLE: CPT | Mod: RT

## 2023-12-15 NOTE — HISTORY OF PRESENT ILLNESS
[de-identified] : R ankle [de-identified] : 79yo M s/p Right ankle open reduction internal fixation of trimalleolar ankle fracture with fixation of the medial and lateral malleolus as well as syndesmotic fixation, 10/9/23.  He is been walking in a boot for the last 2 weeks no pain happy with his progress [de-identified] : Right ankle  exam Incisions are healing well no erythema Mild effusion calf is soft and nontender Able to flex and extend all toes Sensation intact throughout brisk capillary refill. [de-identified] :  The following radiographs were ordered and read by me during this patients visit. I reviewed each radiograph in detail with the patient and discussed the findings as highlighted below.  3 views right ankle obtained today status post fixation of bimalleolar ankle fracture with well-positioned hardware interval healing [de-identified] : 79yo M s/p Right ankle open reduction internal fixation of trimalleolar ankle fracture with fixation of the medial and lateral malleolus as well as syndesmotic fixation, 10/9/23. [de-identified] : We reviewed postoperative protocol restrictions.  Weightbearing as tolerated.  Transition boot as needed.  Follow-up 6 weeks.  All questions answered

## 2024-01-26 ENCOUNTER — APPOINTMENT (OUTPATIENT)
Dept: ORTHOPEDIC SURGERY | Facility: CLINIC | Age: 79
End: 2024-01-26
Payer: MEDICARE

## 2024-01-26 DIAGNOSIS — S82.891D OTHER FRACTURE OF RIGHT LOWER LEG, SUBSEQUENT ENCOUNTER FOR CLOSED FRACTURE WITH ROUTINE HEALING: ICD-10-CM

## 2024-01-26 PROCEDURE — 73610 X-RAY EXAM OF ANKLE: CPT | Mod: RT

## 2024-01-26 PROCEDURE — 99213 OFFICE O/P EST LOW 20 MIN: CPT

## 2024-01-26 NOTE — DISCUSSION/SUMMARY
[de-identified] : History of ankle fracture now healed cleared for activities as tolerated discussed that it may swell for several months more as there is additional healing occurring rest ice elevation ice as needed activities as tolerated he may follow-up as needed

## 2024-01-26 NOTE — HISTORY OF PRESENT ILLNESS
[de-identified] : 77yo M s/p Right ankle open reduction internal fixation of trimalleolar ankle fracture with fixation of the medial and lateral malleolus as well as syndesmotic fixation, 10/9/23. He is overall doing well minimal pain he is walking with a cane he does report swelling at nighttime he has no restrictions in his activities

## 2024-01-26 NOTE — PHYSICAL EXAM
[de-identified] : Right ankle exam Skin: Clean, dry, intact Inspection: No obvious malalignment, no swelling, no effusion Tenderness: No tenderness over the lateral malleolus, medial malleolus, CFL/ATFL/PTFL, deltoid ligament. No tenderness proximal fibula. No tenderness about heel, no pain with heel squeeze. ROM:dorsi flexion 20, plantar flexion 40 normal subtalar motion.  Stability: Negative anterior/posterior drawer. Additional tests: Negative Mortons compression test, Negative syndesmosis squeeze test. Strength: 5/5 TA/GS/EHL Sensation: Intact to light touch in dp/sp/tib/dee dee/saph distributions Pulses: 2+ DP/PT pulses [de-identified] :  The following radiographs were ordered and read by me during this patients visit. I reviewed each radiograph in detail with the patient and discussed the findings as highlighted below.   3 views of the right ankle were obtained today status post open reduction internal fixation with well-positioned hardware the mortise is reduced there is calcification of the vasculature

## 2024-06-19 NOTE — ED PROVIDER NOTE - PRINCIPAL DIAGNOSIS
Quality 226: Preventive Care And Screening: Tobacco Use: Screening And Cessation Intervention: Patient screened for tobacco use and is an ex/non-smoker Detail Level: Detailed Quality 431: Preventive Care And Screening: Unhealthy Alcohol Use - Screening: Patient not identified as an unhealthy alcohol user when screened for unhealthy alcohol use using a systematic screening method Quality 130: Documentation Of Current Medications In The Medical Record: Current Medications Documented Trimalleolar fracture of right ankle

## (undated) DEVICE — DRILL BIT STRYKER 2.6MM

## (undated) DEVICE — DRILL BIT STRYKER 2X135MM

## (undated) DEVICE — DRSG ACE BANDAGE 6"

## (undated) DEVICE — DRAPE SPLIT SHEET 77" X 120"

## (undated) DEVICE — DRSG WEBRIL 6"

## (undated) DEVICE — DRAPE 3/4 SHEET W REINFORCEMENT 56X77"

## (undated) DEVICE — DRSG COMBINE 5X9"

## (undated) DEVICE — DRSG 4 X 4" 4PLY STERILE

## (undated) DEVICE — DRSG XEROFORM 5 X 9"

## (undated) DEVICE — FRA-TOURNIQUET 402402010060008: Type: DURABLE MEDICAL EQUIPMENT

## (undated) DEVICE — VENODYNE/SCD SLEEVE CALF MEDIUM

## (undated) DEVICE — WARMING BLANKET UPPER ADULT

## (undated) DEVICE — PACK ORTHO

## (undated) DEVICE — BLADE SURGICAL #15 CARBON

## (undated) DEVICE — FRA-ESU BOVIE FORCE FX F3B25826A: Type: DURABLE MEDICAL EQUIPMENT